# Patient Record
Sex: MALE | Race: WHITE | Employment: OTHER | URBAN - METROPOLITAN AREA
[De-identification: names, ages, dates, MRNs, and addresses within clinical notes are randomized per-mention and may not be internally consistent; named-entity substitution may affect disease eponyms.]

---

## 2017-01-05 ENCOUNTER — HOSPITAL ENCOUNTER (OUTPATIENT)
Dept: LAB | Age: 72
Discharge: HOME | End: 2017-01-05
Attending: INTERNAL MEDICINE
Payer: MEDICARE

## 2017-01-05 ENCOUNTER — HOSPITAL ENCOUNTER (OUTPATIENT)
Dept: CT IMAGING | Age: 72
Discharge: HOME | End: 2017-01-05
Attending: INTERNAL MEDICINE
Payer: MEDICARE

## 2017-01-05 DIAGNOSIS — R91.8 LUNG MASS: ICD-10-CM

## 2017-01-05 DIAGNOSIS — J98.4 CAVITATING MASS OF LUNG: ICD-10-CM

## 2017-01-05 PROCEDURE — 87102 FUNGUS ISOLATION CULTURE: CPT | Performed by: INTERNAL MEDICINE

## 2017-01-05 PROCEDURE — 71250 CT THORAX DX C-: CPT

## 2017-01-05 PROCEDURE — 87106 FUNGI IDENTIFICATION YEAST: CPT | Performed by: INTERNAL MEDICINE

## 2017-01-07 LAB
BACTERIA SPEC CULT: NORMAL
GRAM STN SPEC: NORMAL
SERVICE CMNT-IMP: NORMAL

## 2017-01-10 PROBLEM — J44.9 COPD, SEVERE (HCC): Chronic | Status: ACTIVE | Noted: 2017-01-10

## 2017-01-10 LAB
FUNGUS SPEC CULT: NORMAL
FUNGUS STAIN, 188244: NORMAL
SPECIMEN SOURCE: NORMAL

## 2017-01-13 LAB
BACTERIA SPEC CULT: NORMAL
FUNGUS CULTURE, RFCO2T: POSITIVE
FUNGUS ID 1, (DID), RFIM1T: NORMAL
FUNGUS SMEAR, RFCO1T: ABNORMAL
REFLEX TO ID, RFCO3T: ABNORMAL
SERVICE CMNT-IMP: NORMAL
SPECIMEN SOURCE: ABNORMAL
SPECIMEN SOURCE: NORMAL

## 2017-02-09 PROBLEM — R63.4 WEIGHT LOSS: Status: ACTIVE | Noted: 2017-02-09

## 2017-02-16 ENCOUNTER — HOSPITAL ENCOUNTER (OUTPATIENT)
Dept: CT IMAGING | Age: 72
Discharge: HOME | End: 2017-02-16
Attending: INTERNAL MEDICINE
Payer: MEDICARE

## 2017-02-16 DIAGNOSIS — R91.8 LUNG MASS: ICD-10-CM

## 2017-02-16 PROCEDURE — 71250 CT THORAX DX C-: CPT

## 2017-02-20 LAB
ACID FAST STN SPEC: NEGATIVE
MYCOBACTERIUM SPEC QL CULT: NEGATIVE
SPECIMEN PREPARATION: NORMAL
SPECIMEN SOURCE: NORMAL

## 2017-05-15 ENCOUNTER — HOSPITAL ENCOUNTER (OUTPATIENT)
Dept: CT IMAGING | Age: 72
Discharge: HOME | End: 2017-05-15
Attending: NURSE PRACTITIONER
Payer: MEDICARE

## 2017-05-15 DIAGNOSIS — R91.8 LUNG MASS: ICD-10-CM

## 2017-05-15 PROCEDURE — 71250 CT THORAX DX C-: CPT

## 2017-05-18 PROBLEM — F17.210 CIGARETTE NICOTINE DEPENDENCE WITHOUT COMPLICATION: Status: ACTIVE | Noted: 2017-05-18

## 2017-11-13 ENCOUNTER — HOSPITAL ENCOUNTER (OUTPATIENT)
Dept: CT IMAGING | Age: 72
Discharge: HOME | End: 2017-11-13
Attending: NURSE PRACTITIONER
Payer: MEDICARE

## 2017-11-13 DIAGNOSIS — R91.8 LUNG MASS: ICD-10-CM

## 2017-11-13 DIAGNOSIS — R91.8 PULMONARY INFILTRATE: ICD-10-CM

## 2017-11-13 PROCEDURE — 71250 CT THORAX DX C-: CPT

## 2018-02-15 PROBLEM — H91.90 HEARING PROBLEM: Status: ACTIVE | Noted: 2018-02-15

## 2018-02-15 PROBLEM — D12.6 ADENOMATOUS POLYP OF COLON: Status: ACTIVE | Noted: 2018-02-15

## 2018-02-15 PROBLEM — F32.A MILD DEPRESSION: Status: ACTIVE | Noted: 2018-02-15

## 2018-05-21 PROBLEM — R91.1 LUNG NODULE: Status: ACTIVE | Noted: 2018-05-21

## 2018-09-21 PROBLEM — J85.2 LUNG ABSCESS (HCC): Status: ACTIVE | Noted: 2018-09-21

## 2018-11-01 PROBLEM — N18.9 CHRONIC KIDNEY DISEASE: Status: ACTIVE | Noted: 2018-11-01

## 2019-02-18 PROBLEM — Z79.899 ENCOUNTER FOR LONG-TERM CURRENT USE OF MEDICATION: Status: ACTIVE | Noted: 2019-02-18

## 2019-08-27 PROBLEM — J85.2 LUNG ABSCESS (HCC): Status: RESOLVED | Noted: 2018-09-21 | Resolved: 2019-08-27

## 2020-07-03 NOTE — PROGRESS NOTES
Confirmed scheduled procedure with patient's wife. Informed family member of time of arrival, entry location, and 1 visitor policy. Opportunity for questions provided. No concerns voiced at this time. Patient's wife confirmed patient did get COVID swab, results pending. Patient's wife stated she would try to obtain results from patient's PCP.

## 2020-07-05 NOTE — PROGRESS NOTES
Spoke with patient's wife. She will be calling PCP office in AM and will have COVID-19 result faxed to [facility]. Patient's wife verbalized understanding that we must have copy of NEGATIVE COVID-19 result for procedure to be performed. Confirm arrival time of 0930 for procedure. Pt's wife verbalized understanding.

## 2020-07-06 ENCOUNTER — ANESTHESIA (OUTPATIENT)
Dept: ENDOSCOPY | Age: 75
End: 2020-07-06
Payer: MEDICARE

## 2020-07-06 ENCOUNTER — HOSPITAL ENCOUNTER (OUTPATIENT)
Age: 75
Setting detail: OUTPATIENT SURGERY
Discharge: HOME | End: 2020-07-06
Attending: INTERNAL MEDICINE | Admitting: INTERNAL MEDICINE
Payer: MEDICARE

## 2020-07-06 ENCOUNTER — ANESTHESIA EVENT (OUTPATIENT)
Dept: ENDOSCOPY | Age: 75
End: 2020-07-06
Payer: MEDICARE

## 2020-07-06 VITALS
OXYGEN SATURATION: 97 % | HEART RATE: 73 BPM | BODY MASS INDEX: 21.98 KG/M2 | TEMPERATURE: 97 F | HEIGHT: 68 IN | WEIGHT: 145 LBS | DIASTOLIC BLOOD PRESSURE: 65 MMHG | SYSTOLIC BLOOD PRESSURE: 126 MMHG | RESPIRATION RATE: 18 BRPM

## 2020-07-06 PROCEDURE — 74011250636 HC RX REV CODE- 250/636: Performed by: NURSE ANESTHETIST, CERTIFIED REGISTERED

## 2020-07-06 PROCEDURE — 77030013991 HC SNR POLYP ENDOSC BSC -A: Performed by: INTERNAL MEDICINE

## 2020-07-06 PROCEDURE — 76040000026: Performed by: INTERNAL MEDICINE

## 2020-07-06 PROCEDURE — 77030021593 HC FCPS BIOP ENDOSC BSC -A: Performed by: INTERNAL MEDICINE

## 2020-07-06 PROCEDURE — 76060000032 HC ANESTHESIA 0.5 TO 1 HR: Performed by: INTERNAL MEDICINE

## 2020-07-06 PROCEDURE — 74011000250 HC RX REV CODE- 250: Performed by: NURSE ANESTHETIST, CERTIFIED REGISTERED

## 2020-07-06 PROCEDURE — 88305 TISSUE EXAM BY PATHOLOGIST: CPT

## 2020-07-06 PROCEDURE — 74011250636 HC RX REV CODE- 250/636: Performed by: ANESTHESIOLOGY

## 2020-07-06 RX ORDER — EPHEDRINE SULFATE/0.9% NACL/PF 50 MG/5 ML
SYRINGE (ML) INTRAVENOUS AS NEEDED
Status: DISCONTINUED | OUTPATIENT
Start: 2020-07-06 | End: 2020-07-06 | Stop reason: HOSPADM

## 2020-07-06 RX ORDER — LIDOCAINE HYDROCHLORIDE 20 MG/ML
INJECTION, SOLUTION EPIDURAL; INFILTRATION; INTRACAUDAL; PERINEURAL AS NEEDED
Status: DISCONTINUED | OUTPATIENT
Start: 2020-07-06 | End: 2020-07-06 | Stop reason: HOSPADM

## 2020-07-06 RX ORDER — PROPOFOL 10 MG/ML
INJECTION, EMULSION INTRAVENOUS
Status: DISCONTINUED | OUTPATIENT
Start: 2020-07-06 | End: 2020-07-06 | Stop reason: HOSPADM

## 2020-07-06 RX ORDER — GLYCOPYRROLATE 0.2 MG/ML
INJECTION INTRAMUSCULAR; INTRAVENOUS AS NEEDED
Status: DISCONTINUED | OUTPATIENT
Start: 2020-07-06 | End: 2020-07-06 | Stop reason: HOSPADM

## 2020-07-06 RX ORDER — SODIUM CHLORIDE, SODIUM LACTATE, POTASSIUM CHLORIDE, CALCIUM CHLORIDE 600; 310; 30; 20 MG/100ML; MG/100ML; MG/100ML; MG/100ML
1000 INJECTION, SOLUTION INTRAVENOUS CONTINUOUS
Status: DISCONTINUED | OUTPATIENT
Start: 2020-07-06 | End: 2020-07-06 | Stop reason: HOSPADM

## 2020-07-06 RX ORDER — PROPOFOL 10 MG/ML
INJECTION, EMULSION INTRAVENOUS AS NEEDED
Status: DISCONTINUED | OUTPATIENT
Start: 2020-07-06 | End: 2020-07-06 | Stop reason: HOSPADM

## 2020-07-06 RX ADMIN — Medication 15 MG: at 10:03

## 2020-07-06 RX ADMIN — PHENYLEPHRINE HYDROCHLORIDE 50 MCG: 10 INJECTION INTRAVENOUS at 10:33

## 2020-07-06 RX ADMIN — PROPOFOL 20 MG: 10 INJECTION, EMULSION INTRAVENOUS at 09:58

## 2020-07-06 RX ADMIN — PROPOFOL 50 MG: 10 INJECTION, EMULSION INTRAVENOUS at 09:55

## 2020-07-06 RX ADMIN — LIDOCAINE HYDROCHLORIDE 60 MG: 20 INJECTION, SOLUTION EPIDURAL; INFILTRATION; INTRACAUDAL; PERINEURAL at 09:55

## 2020-07-06 RX ADMIN — SODIUM CHLORIDE, SODIUM LACTATE, POTASSIUM CHLORIDE, AND CALCIUM CHLORIDE 1000 ML: 600; 310; 30; 20 INJECTION, SOLUTION INTRAVENOUS at 09:32

## 2020-07-06 RX ADMIN — PHENYLEPHRINE HYDROCHLORIDE 50 MCG: 10 INJECTION INTRAVENOUS at 10:07

## 2020-07-06 RX ADMIN — PROPOFOL 140 MCG/KG/MIN: 10 INJECTION, EMULSION INTRAVENOUS at 09:55

## 2020-07-06 RX ADMIN — GLYCOPYRROLATE 0.1 MG: 0.2 INJECTION INTRAMUSCULAR; INTRAVENOUS at 09:55

## 2020-07-06 RX ADMIN — PHENYLEPHRINE HYDROCHLORIDE 50 MCG: 10 INJECTION INTRAVENOUS at 10:03

## 2020-07-06 RX ADMIN — PHENYLEPHRINE HYDROCHLORIDE 50 MCG: 10 INJECTION INTRAVENOUS at 10:16

## 2020-07-06 NOTE — DISCHARGE INSTRUCTIONS
Gastrointestinal Colonoscopy/Flexible Sigmoidoscopy - Lower Exam Discharge Instructions  1. Call Dr. Usman Malagon at [phone] for any problems or questions. 2. Contact the doctors office for follow up appointment as directed  3. Medication may cause drowsiness for several hours, therefore:  · Do not drive or operate machinery for reminder of the day. · No alcohol today. · Do not make any important or legal decisions for 24 hours. · Do not sign any legal documents for 24 hours. 4. Ordinarily, you may resume regular diet and activity after exam unless otherwise specified by your physician. 5. Because of air put into your colon during exam, you may experience some abdominal distension, relieved by the passage of gas, for several hours. 6. Contact your physician if you have any of the following:  · Excessive amount of bleeding - large amount when having a bowel movement. Occasional specks of blood with bowel movement would not be unusual.  · Severe abdominal pain  · Fever or Chills  7. Polyp Removal - follow these additional instructions  · Soft diet for 24 hours, then resume regular diet   · No Aspirin, Advil, Aleve, Nuprin, Ibuprofen, or medications that contain these drugs for 2 weeks. Any additional instructions:   1. Follow -up pathology  2. Repeat colonoscopy depending on pathology. Instructions given to Ange Kitchen and other family members.

## 2020-07-06 NOTE — H&P
Gastroenterology Outpatient History and Physical    Patient: Skinny Garcia    Physician: Aylin Yoo MD    Vital Signs: There were no vitals taken for this visit. Allergies: Allergies   Allergen Reactions    Pollen Extracts Unknown (comments)       Chief Complaint: Hx f colon polyps  History of Present Illness: As Above    Justification for Procedure: As Above    History:  Past Medical History:   Diagnosis Date    Arthritis     Chronic obstructive pulmonary disease ([address]     daily and prn inhaler    Depression     anxiety, treated with med    Ear problems     Erectile disorder due to medical condition in male patient     GERD (gastroesophageal reflux disease)     omeprazole prn    Gout     Hearing reduced     has hearing aids, but wears them infrequently    History of colon polyps     ~2015    History of kidney stones     1-2 episodes years ago.  Hyperlipidemia     on no med    Hypertension     Peripheral vascular disease ([address]     Psoriasis     Transient ischemic attack     patient denies 6/26/20      Past Surgical History:   Procedure Laterality Date    HX ACL RECONSTRUCTION      HX BACK SURGERY      HX GI      colo    HX LITHOTRIPSY        Social History     Socioeconomic History    Marital status:      Spouse name: Not on file    Number of children: Not on file    Years of education: Not on file    Highest education level: Not on file   Tobacco Use    Smoking status: Current Some Day Smoker     Packs/day: 1.00     Years: 40.00     Pack years: 40.00     Types: Cigarettes     Last attempt to quit: 11/1/2016     Years since quitting: 3.6    Smokeless tobacco: Never Used    Tobacco comment: currently smoking 2-3 cigarettes a day   Substance and Sexual Activity    Alcohol use: No    Drug use: No   Social History Narrative     and lives with wife. No pets.       Family History   Problem Relation Age of Onset    Hypertension Mother     Kidney Disease Mother  Hypertension Father     Stroke Father        Medications:   Prior to Admission medications    Medication Sig Start Date End Date Taking? Authorizing Provider   budesonide-formoteroL (Symbicort) 160-4.5 mcg/actuation HFAA Take 2 Puffs by inhalation two (2) times a day. Provider, Samuel   chlorthalidone (HYGROTEN) 25 mg tablet Take 1 Tab by mouth daily. Indications: high blood pressure  Patient taking differently: Take 25 mg by mouth every morning. Indications: high blood pressure 2/24/20   Zully Yin MD   benazepriL (LOTENSIN) 40 mg tablet TAKE ONE TABLET BY MOUTH  DAILY  Patient taking differently: Take 40 mg by mouth every morning. 2/24/20   Zully Yin MD   metoprolol succinate (TOPROL-XL) 100 mg tablet TAKE ONE TABLET BY MOUTH ONCE DAILY. Indications: ventricular rate control in atrial fibrillation  Patient taking differently: Take 100 mg by mouth every morning. Indications: ventricular rate control in atrial fibrillation 2/24/20   Zully Yin MD   amLODIPine (NORVASC) 10 mg tablet TAKE ONE TABLET BY MOUTH ONCE DAILY. Patient taking differently: Take 10 mg by mouth every morning. 2/24/20   Zully Yin MD   allopurinoL (ZYLOPRIM) 300 mg tablet TAKE 1 TAB BY MOUTH DAILY. INDICATIONS: GOUT  Patient taking differently: Take 300 mg by mouth every morning. INDICATIONS: GOUT 2/24/20   Zully Yin MD   Omeprazole delayed release (PRILOSEC D/R) 20 mg tablet Take 20 mg by mouth daily as needed. 2/24/20   Zully Yin MD   albuterol (VENTOLIN HFA) 90 mcg/actuation inhaler Take 2 Puffs by inhalation every four (4) hours as needed for Shortness of Breath. 8/20/19   Zully Yin MD   escitalopram oxalate (LEXAPRO) 20 mg tablet TAKE ONE TABLET BY MOUTH ONCE DAILY. Patient taking differently: Take 20 mg by mouth every morning.  8/19/19   Zully Yin MD   clobetasol (TEMOVATE) 0.05 % external solution APPLY TWICE DAILY AS DIRECTED 4/25/19   Zully Yin MD   naproxen sodium (ALEVE) 220 mg tablet Take 220 mg by mouth every six (6) hours as needed.     Provider, Historical       Physical Exam:         Heart: regular rate and rhythm, S1, S2 normal, no murmur, click, rub or gallop   Lungs: chest clear, no wheezing, rales, normal symmetric air entry, Heart exam - S1, S2 normal, no murmur, no gallop, rate regular   Abdominal: Bowel sounds are normal, liver is not enlarged, spleen is not enlarged           Findings/Diagnosis: Hx of colon polyps        Signed:  Terrie Haro MD 7/6/2020

## 2020-07-06 NOTE — PROGRESS NOTES
Attempted pre-procedural visit, as requested. Pt had already been taken to his procedure earlier than anticipated, based on schedule.     Deborah Meng MDiv, [address] [place] [address]

## 2020-07-06 NOTE — ANESTHESIA POSTPROCEDURE EVALUATION
Procedure(s):  COLONOSCOPY/BMI 23  ENDOSCOPIC POLYPECTOMY.     total IV anesthesia    Anesthesia Post Evaluation        Patient location during evaluation: PACU  Patient participation: complete - patient participated  Level of consciousness: awake and alert  Pain management: adequate  Airway patency: patent  Anesthetic complications: no  Cardiovascular status: acceptable  Respiratory status: acceptable  Hydration status: acceptable  Post anesthesia nausea and vomiting:  none  Final Post Anesthesia Temperature Assessment:  Normothermia (36.0-37.5 degrees C)      INITIAL Post-op Vital signs:   Vitals Value Taken Time   /65 7/6/2020 11:04 AM   Temp 36.1 °C (97 °F) 7/6/2020 10:33 AM   Pulse 73 7/6/2020 10:33 AM   Resp 18 7/6/2020 11:04 AM   SpO2 97 % 7/6/2020 11:04 AM

## 2020-07-06 NOTE — PROCEDURES
Colonoscopy Procedure Note    Indications: Hx of colon polyps    Anesthesia/Sedation: MAC IV     Pre-Procedure Physical:  No current facility-administered medications for this encounter. Pollen extracts    No data found. Exam:    Airway: clear   Heart: normal S1and S2    Lungs: clear bilateral  Abdomen: soft, nontender, bowel sounds present and normal in all quads   Mental Status: awake, alert and oriented to person, place and time        Procedure Details      Informed consent was obtained for the procedure, including sedation. Risks of perforation, hemorrhage, adverse drug reaction and aspiration were discussed. The patient was placed in the left lateral decubitus position. Based on the pre-procedure assessment, including review of the patient's medical history, medications, allergies, and review of systems, he had been deemed to be an appropriate candidate for conscious sedation; he was therefore sedated with the medications listed below. The patient was monitored continuously with ECG tracing, pulse oximetry, blood pressure monitoring, and direct observations. A rectal examination was performed. The colonoscope was inserted into the rectum and advanced under direct vision to the cecum. The quality of the colonic preparation was good. A careful inspection was made as the colonoscope was withdrawn, including a retroflexed view of the rectum; findings and interventions are described below. Appropriate photodocumentation was obtained. Findings: Multiple polyps in the sigmoid colon (8). The largest was ne cm and was hot snared. These polyps were removed by snare or cold bxed away. Sigmoid diverticulosis was seen. Large internal hemorrhoids noted. Polyps were also seen in the TC and AC. Mostly these were 2 to 4 mm and removed by cold snare or cold bx technique.     Specimens: Polyps as above    Estimated Blood Loss: 1 to 2 ccs           Complications: None; patient tolerated the procedure well.           Attending Attestation: I performed the procedure. Impression:  Clon polyps (sigmoid, TC and AC), diverticulosis and IHs. Recommendations: F/U pathology, Avoid NSAIDs. Repeat colon depends on pathology and his age factor.     Massiel Luong MD

## 2020-07-06 NOTE — ROUTINE PROCESS
Pt discharged home with spouse , spouse driving, VSS, instructions reviewed with patient and spouse , understanding verbalized. All belongings sent home with Pt. Pt transported out via wheelchair by [employer].

## 2021-03-09 PROBLEM — I35.8 AORTIC HEART MURMUR: Status: ACTIVE | Noted: 2021-03-09

## 2021-09-27 PROBLEM — C43.4 MELANOMA OF NECK (HCC): Status: ACTIVE | Noted: 2021-09-27

## 2021-09-27 PROBLEM — C43.59 MELANOMA OF BACK (HCC): Status: ACTIVE | Noted: 2021-09-27

## 2021-10-12 ENCOUNTER — HOSPITAL ENCOUNTER (OUTPATIENT)
Dept: GENERAL RADIOLOGY | Age: 76
Discharge: HOME | End: 2021-10-12
Payer: MEDICARE

## 2021-10-12 ENCOUNTER — HOSPITAL ENCOUNTER (OUTPATIENT)
Dept: SURGERY | Age: 76
Discharge: HOME | End: 2021-10-12
Attending: SURGERY
Payer: MEDICARE

## 2021-10-12 VITALS
WEIGHT: 135.8 LBS | TEMPERATURE: 97.7 F | SYSTOLIC BLOOD PRESSURE: 130 MMHG | RESPIRATION RATE: 16 BRPM | HEIGHT: 68 IN | BODY MASS INDEX: 20.58 KG/M2 | HEART RATE: 66 BPM | OXYGEN SATURATION: 96 % | DIASTOLIC BLOOD PRESSURE: 63 MMHG

## 2021-10-12 LAB
ALBUMIN SERPL-MCNC: 3.1 G/DL (ref 3.2–4.6)
ALBUMIN/GLOB SERPL: 0.9 {RATIO} (ref 1.2–3.5)
ALP SERPL-CCNC: 98 U/L (ref 50–136)
ALT SERPL-CCNC: 13 U/L (ref 12–65)
ANION GAP SERPL CALC-SCNC: 6 MMOL/L (ref 7–16)
AST SERPL-CCNC: 12 U/L (ref 15–37)
BASOPHILS # BLD: 0.1 K/UL (ref 0–0.2)
BASOPHILS NFR BLD: 1 % (ref 0–2)
BILIRUB SERPL-MCNC: 0.8 MG/DL (ref 0.2–1.1)
BUN SERPL-MCNC: 24 MG/DL (ref 8–23)
CALCIUM SERPL-MCNC: 8.7 MG/DL (ref 8.3–10.4)
CHLORIDE SERPL-SCNC: 108 MMOL/L (ref 98–107)
CO2 SERPL-SCNC: 28 MMOL/L (ref 21–32)
CREAT SERPL-MCNC: 1.63 MG/DL (ref 0.8–1.5)
DIFFERENTIAL METHOD BLD: ABNORMAL
EOSINOPHIL # BLD: 0.2 K/UL (ref 0–0.8)
EOSINOPHIL NFR BLD: 3 % (ref 0.5–7.8)
ERYTHROCYTE [DISTWIDTH] IN BLOOD BY AUTOMATED COUNT: 14.7 % (ref 11.9–14.6)
GLOBULIN SER CALC-MCNC: 3.4 G/DL (ref 2.3–3.5)
GLUCOSE SERPL-MCNC: 113 MG/DL (ref 65–100)
HCT VFR BLD AUTO: 36.5 % (ref 41.1–50.3)
HGB BLD-MCNC: 12.2 G/DL (ref 13.6–17.2)
IMM GRANULOCYTES # BLD AUTO: 0 K/UL (ref 0–0.5)
IMM GRANULOCYTES NFR BLD AUTO: 1 % (ref 0–5)
LYMPHOCYTES # BLD: 1.4 K/UL (ref 0.5–4.6)
LYMPHOCYTES NFR BLD: 20 % (ref 13–44)
MCH RBC QN AUTO: 30.3 PG (ref 26.1–32.9)
MCHC RBC AUTO-ENTMCNC: 33.4 G/DL (ref 31.4–35)
MCV RBC AUTO: 90.6 FL (ref 79.6–97.8)
MONOCYTES # BLD: 0.3 K/UL (ref 0.1–1.3)
MONOCYTES NFR BLD: 5 % (ref 4–12)
NEUTS SEG # BLD: 4.9 K/UL (ref 1.7–8.2)
NEUTS SEG NFR BLD: 71 % (ref 43–78)
NRBC # BLD: 0 K/UL (ref 0–0.2)
PLATELET # BLD AUTO: 187 K/UL (ref 150–450)
PMV BLD AUTO: 9.8 FL (ref 9.4–12.3)
POTASSIUM SERPL-SCNC: 3.9 MMOL/L (ref 3.5–5.1)
PROT SERPL-MCNC: 6.5 G/DL (ref 6.3–8.2)
RBC # BLD AUTO: 4.03 M/UL (ref 4.23–5.6)
SODIUM SERPL-SCNC: 142 MMOL/L (ref 136–145)
WBC # BLD AUTO: 7 K/UL (ref 4.3–11.1)

## 2021-10-12 PROCEDURE — 80053 COMPREHEN METABOLIC PANEL: CPT

## 2021-10-12 PROCEDURE — 71046 X-RAY EXAM CHEST 2 VIEWS: CPT

## 2021-10-12 PROCEDURE — 85025 COMPLETE CBC W/AUTO DIFF WBC: CPT

## 2021-10-12 NOTE — PERIOP NOTES
Patient verified name and     Order for consent was found in EHR and matches case posting; patient verified. Type 1B surgery, walk-in assessment complete. Labs per surgeon: CBC, CMP  Labs per anesthesia protocol: no additional  EKG: not required    Patient COVID test date 10/15/21; Patient did show for the appointment. [facility] [place]. If appointment is needed patient provided telephone number of [phone]. Hospital approved surgical skin cleanser and instructions given per hospital policy. Patient provided with and instructed on educational handouts including Guide to Surgery, Pain Management, Hand Hygiene, Blood Transfusion Education, and [facility] Brochure. Patient answered medical/surgical history questions at their best of ability. All prior to admission medications documented in [facility]. Original medication prescription bottle was visualized during patient appointment. Patient instructed to hold all vitamins 7 days prior to surgery and NSAIDS 5 days prior to surgery, patient verbalized understanding. Patient teach back successful and patient demonstrates knowledge of instructions.

## 2021-10-12 NOTE — PERIOP NOTES
Recent Results (from the past 12 hour(s))   CBC WITH AUTOMATED DIFF    Collection Time: 10/12/21 11:27 AM   Result Value Ref Range    WBC 7.0 4.3 - 11.1 K/uL    RBC 4.03 (L) 4.23 - 5.6 M/uL    HGB 12.2 (L) 13.6 - 17.2 g/dL    HCT 36.5 (L) 41.1 - 50.3 %    MCV 90.6 79.6 - 97.8 FL    MCH 30.3 26.1 - 32.9 PG    MCHC 33.4 31.4 - 35.0 g/dL    RDW 14.7 (H) 11.9 - 14.6 %    PLATELET 211 427 - 610 K/uL    MPV 9.8 9.4 - 12.3 FL    ABSOLUTE NRBC 0.00 0.0 - 0.2 K/uL    DF AUTOMATED      NEUTROPHILS 71 43 - 78 %    LYMPHOCYTES 20 13 - 44 %    MONOCYTES 5 4.0 - 12.0 %    EOSINOPHILS 3 0.5 - 7.8 %    BASOPHILS 1 0.0 - 2.0 %    IMMATURE GRANULOCYTES 1 0.0 - 5.0 %    ABS. NEUTROPHILS 4.9 1.7 - 8.2 K/UL    ABS. LYMPHOCYTES 1.4 0.5 - 4.6 K/UL    ABS. MONOCYTES 0.3 0.1 - 1.3 K/UL    ABS. EOSINOPHILS 0.2 0.0 - 0.8 K/UL    ABS. BASOPHILS 0.1 0.0 - 0.2 K/UL    ABS. IMM. GRANS. 0.0 0.0 - 0.5 K/UL   METABOLIC PANEL, COMPREHENSIVE    Collection Time: 10/12/21 11:27 AM   Result Value Ref Range    Sodium 142 136 - 145 mmol/L    Potassium 3.9 3.5 - 5.1 mmol/L    Chloride 108 (H) 98 - 107 mmol/L    CO2 28 21 - 32 mmol/L    Anion gap 6 (L) 7 - 16 mmol/L    Glucose 113 (H) 65 - 100 mg/dL    BUN 24 (H) 8 - 23 MG/DL    Creatinine 1.63 (H) 0.8 - 1.5 MG/DL    GFR est AA 53 (L) >60 ml/min/1.73m2    GFR est non-AA 44 (L) >60 ml/min/1.73m2    Calcium 8.7 8.3 - 10.4 MG/DL    Bilirubin, total 0.8 0.2 - 1.1 MG/DL    ALT (SGPT) 13 12 - 65 U/L    AST (SGOT) 12 (L) 15 - 37 U/L    Alk. phosphatase 98 50 - 136 U/L    Protein, total 6.5 6.3 - 8.2 g/dL    Albumin 3.1 (L) 3.2 - 4.6 g/dL    Globulin 3.4 2.3 - 3.5 g/dL    A-G Ratio 0.9 (L) 1.2 - 3.5       CXR Results  (Last 48 hours)               10/12/21 1246  XR CHEST PA LAT Final result    Impression:  COPD. No acute cardiopulmonary disease. Narrative:  EXAM: XR CHEST PA LAT       INDICATION: melanoma right neck c43.59       COMPARISON: None.        FINDINGS: PA and lateral radiographs of the chest demonstrate COPD with clear   lungs. Scarring at the left lung apex. The cardiac and mediastinal contours and   pulmonary vascularity are normal. The bones and soft tissues are within normal   limits.

## 2021-10-12 NOTE — PERIOP NOTES
PLEASE CONTINUE TAKING ALL PRESCRIPTION MEDICATIONS UP TO THE DAY OF SURGERY UNLESS OTHERWISE DIRECTED BELOW. DISCONTINUE all vitamins and supplements 7 days prior to surgery. DISCONTINUE Non-Steriodal Anti-Inflammatory (NSAIDS) such as Advil and Aleve 5 days prior to surgery. Home Medications to take  the day of surgery   Amlodipine (Norvasc)  Use Symbicort inhaler    Escitalopram oxalate (LEXAPRO)  Metoprolol (Lopressor)        Home Medications   to Hold   Morning of surgery DO NOT TAKE Benazepril (Lotensin), chlorthalidone(Hygroten)        Comments    Covid test 10/15 @ [address] [place] [address]    On the day before surgery please take Acetaminophen 1000mg in the morning and then again before bed. You may substitute for Tylenol 650 mg. Please do not bring home medications with you on the day of surgery unless otherwise directed by your nurse. If you are instructed to bring home medications, please give them to your nurse as they will be administered by the nursing staff. If you have any questions, please call [facility] [phone] or [facility] [phone]. A copy of this note was provided to the patient for reference.

## 2021-10-18 ENCOUNTER — ANESTHESIA EVENT (OUTPATIENT)
Dept: SURGERY | Age: 76
End: 2021-10-18
Payer: MEDICARE

## 2021-10-19 ENCOUNTER — ANESTHESIA (OUTPATIENT)
Dept: SURGERY | Age: 76
End: 2021-10-19
Payer: MEDICARE

## 2021-10-19 ENCOUNTER — APPOINTMENT (OUTPATIENT)
Dept: NUCLEAR MEDICINE | Age: 76
End: 2021-10-19
Attending: SURGERY
Payer: MEDICARE

## 2021-10-19 ENCOUNTER — HOSPITAL ENCOUNTER (OUTPATIENT)
Age: 76
Setting detail: OUTPATIENT SURGERY
Discharge: HOME | End: 2021-10-19
Attending: SURGERY | Admitting: SURGERY
Payer: MEDICARE

## 2021-10-19 VITALS
HEART RATE: 70 BPM | HEIGHT: 68 IN | DIASTOLIC BLOOD PRESSURE: 70 MMHG | SYSTOLIC BLOOD PRESSURE: 125 MMHG | RESPIRATION RATE: 14 BRPM | BODY MASS INDEX: 20.46 KG/M2 | TEMPERATURE: 97 F | OXYGEN SATURATION: 94 % | WEIGHT: 135 LBS

## 2021-10-19 DIAGNOSIS — C43.59 MELANOMA OF BACK (HCC): Primary | ICD-10-CM

## 2021-10-19 DIAGNOSIS — C43.9 MALIGNANT MELANOMA, UNSPECIFIED SITE (HCC): ICD-10-CM

## 2021-10-19 PROCEDURE — A9541 TC99M SULFUR COLLOID: HCPCS

## 2021-10-19 PROCEDURE — 76210000026 HC REC RM PH II 1 TO 1.5 HR: Performed by: SURGERY

## 2021-10-19 PROCEDURE — 74011250637 HC RX REV CODE- 250/637: Performed by: ANESTHESIOLOGY

## 2021-10-19 PROCEDURE — 74011250636 HC RX REV CODE- 250/636: Performed by: REGISTERED NURSE

## 2021-10-19 PROCEDURE — 77030019908 HC STETH ESOPH SIMS -A: Performed by: ANESTHESIOLOGY

## 2021-10-19 PROCEDURE — 11606 EXC TR-EXT MAL+MARG >4 CM: CPT | Performed by: SURGERY

## 2021-10-19 PROCEDURE — 88305 TISSUE EXAM BY PATHOLOGIST: CPT

## 2021-10-19 PROCEDURE — 77030011256 HC DRSG MEPILEX <16IN NO BORD MOLN -A: Performed by: SURGERY

## 2021-10-19 PROCEDURE — 74011250636 HC RX REV CODE- 250/636: Performed by: SURGERY

## 2021-10-19 PROCEDURE — 76210000063 HC OR PH I REC FIRST 0.5 HR: Performed by: SURGERY

## 2021-10-19 PROCEDURE — 77030037088 HC TUBE ENDOTRACH ORAL NSL COVD-A: Performed by: REGISTERED NURSE

## 2021-10-19 PROCEDURE — 77030039425 HC BLD LARYNG TRULITE DISP TELE -A: Performed by: REGISTERED NURSE

## 2021-10-19 PROCEDURE — 74011250636 HC RX REV CODE- 250/636: Performed by: ANESTHESIOLOGY

## 2021-10-19 PROCEDURE — 74011000250 HC RX REV CODE- 250: Performed by: SURGERY

## 2021-10-19 PROCEDURE — 77030003029 HC SUT VCRL J&J -B: Performed by: SURGERY

## 2021-10-19 PROCEDURE — 76060000033 HC ANESTHESIA 1 TO 1.5 HR: Performed by: SURGERY

## 2021-10-19 PROCEDURE — 76010000161 HC OR TIME 1 TO 1.5 HR INTENSV-TIER 1: Performed by: SURGERY

## 2021-10-19 PROCEDURE — 74011000250 HC RX REV CODE- 250: Performed by: REGISTERED NURSE

## 2021-10-19 PROCEDURE — 2709999900 HC NON-CHARGEABLE SUPPLY: Performed by: SURGERY

## 2021-10-19 RX ORDER — SODIUM CHLORIDE 0.9 % (FLUSH) 0.9 %
5-40 SYRINGE (ML) INJECTION EVERY 8 HOURS
Status: DISCONTINUED | OUTPATIENT
Start: 2021-10-19 | End: 2021-10-19 | Stop reason: HOSPADM

## 2021-10-19 RX ORDER — GLYCOPYRROLATE 0.2 MG/ML
INJECTION INTRAMUSCULAR; INTRAVENOUS AS NEEDED
Status: DISCONTINUED | OUTPATIENT
Start: 2021-10-19 | End: 2021-10-19 | Stop reason: HOSPADM

## 2021-10-19 RX ORDER — ROCURONIUM BROMIDE 10 MG/ML
INJECTION, SOLUTION INTRAVENOUS AS NEEDED
Status: DISCONTINUED | OUTPATIENT
Start: 2021-10-19 | End: 2021-10-19 | Stop reason: HOSPADM

## 2021-10-19 RX ORDER — SODIUM CHLORIDE 0.9 % (FLUSH) 0.9 %
5-40 SYRINGE (ML) INJECTION AS NEEDED
Status: DISCONTINUED | OUTPATIENT
Start: 2021-10-19 | End: 2021-10-19 | Stop reason: HOSPADM

## 2021-10-19 RX ORDER — FENTANYL CITRATE 50 UG/ML
INJECTION, SOLUTION INTRAMUSCULAR; INTRAVENOUS AS NEEDED
Status: DISCONTINUED | OUTPATIENT
Start: 2021-10-19 | End: 2021-10-19 | Stop reason: HOSPADM

## 2021-10-19 RX ORDER — SODIUM CHLORIDE, SODIUM LACTATE, POTASSIUM CHLORIDE, CALCIUM CHLORIDE 600; 310; 30; 20 MG/100ML; MG/100ML; MG/100ML; MG/100ML
75 INJECTION, SOLUTION INTRAVENOUS CONTINUOUS
Status: DISCONTINUED | OUTPATIENT
Start: 2021-10-19 | End: 2021-10-19 | Stop reason: HOSPADM

## 2021-10-19 RX ORDER — FLUMAZENIL 0.1 MG/ML
0.2 INJECTION INTRAVENOUS AS NEEDED
Status: DISCONTINUED | OUTPATIENT
Start: 2021-10-19 | End: 2021-10-19 | Stop reason: HOSPADM

## 2021-10-19 RX ORDER — CEFAZOLIN SODIUM/WATER 2 G/20 ML
2 SYRINGE (ML) INTRAVENOUS ONCE
Status: COMPLETED | OUTPATIENT
Start: 2021-10-19 | End: 2021-10-19

## 2021-10-19 RX ORDER — BUPIVACAINE HYDROCHLORIDE 2.5 MG/ML
INJECTION, SOLUTION EPIDURAL; INFILTRATION; INTRACAUDAL AS NEEDED
Status: DISCONTINUED | OUTPATIENT
Start: 2021-10-19 | End: 2021-10-19 | Stop reason: HOSPADM

## 2021-10-19 RX ORDER — ACETAMINOPHEN 500 MG
1000 TABLET ORAL ONCE
Status: COMPLETED | OUTPATIENT
Start: 2021-10-19 | End: 2021-10-19

## 2021-10-19 RX ORDER — HYDROCODONE BITARTRATE AND ACETAMINOPHEN 5; 325 MG/1; MG/1
1-2 TABLET ORAL EVERY 8 HOURS
Qty: 28 TABLET | Refills: 0 | Status: SHIPPED | OUTPATIENT
Start: 2021-10-19 | End: 2021-10-26

## 2021-10-19 RX ORDER — OXYCODONE HYDROCHLORIDE 5 MG/1
10 TABLET ORAL
Status: DISCONTINUED | OUTPATIENT
Start: 2021-10-19 | End: 2021-10-19 | Stop reason: HOSPADM

## 2021-10-19 RX ORDER — NEOSTIGMINE METHYLSULFATE 1 MG/ML
INJECTION, SOLUTION INTRAVENOUS AS NEEDED
Status: DISCONTINUED | OUTPATIENT
Start: 2021-10-19 | End: 2021-10-19 | Stop reason: HOSPADM

## 2021-10-19 RX ORDER — PROPOFOL 10 MG/ML
INJECTION, EMULSION INTRAVENOUS AS NEEDED
Status: DISCONTINUED | OUTPATIENT
Start: 2021-10-19 | End: 2021-10-19 | Stop reason: HOSPADM

## 2021-10-19 RX ORDER — LIDOCAINE HYDROCHLORIDE 10 MG/ML
0.1 INJECTION INFILTRATION; PERINEURAL AS NEEDED
Status: DISCONTINUED | OUTPATIENT
Start: 2021-10-19 | End: 2021-10-19 | Stop reason: HOSPADM

## 2021-10-19 RX ORDER — DIPHENHYDRAMINE HYDROCHLORIDE 50 MG/ML
12.5 INJECTION, SOLUTION INTRAMUSCULAR; INTRAVENOUS
Status: DISCONTINUED | OUTPATIENT
Start: 2021-10-19 | End: 2021-10-19 | Stop reason: HOSPADM

## 2021-10-19 RX ORDER — ONDANSETRON 2 MG/ML
INJECTION INTRAMUSCULAR; INTRAVENOUS AS NEEDED
Status: DISCONTINUED | OUTPATIENT
Start: 2021-10-19 | End: 2021-10-19 | Stop reason: HOSPADM

## 2021-10-19 RX ORDER — LIDOCAINE HYDROCHLORIDE 20 MG/ML
INJECTION, SOLUTION EPIDURAL; INFILTRATION; INTRACAUDAL; PERINEURAL AS NEEDED
Status: DISCONTINUED | OUTPATIENT
Start: 2021-10-19 | End: 2021-10-19 | Stop reason: HOSPADM

## 2021-10-19 RX ORDER — OXYCODONE HYDROCHLORIDE 5 MG/1
5 TABLET ORAL
Status: COMPLETED | OUTPATIENT
Start: 2021-10-19 | End: 2021-10-19

## 2021-10-19 RX ORDER — EPHEDRINE SULFATE/0.9% NACL/PF 50 MG/5 ML
SYRINGE (ML) INTRAVENOUS AS NEEDED
Status: DISCONTINUED | OUTPATIENT
Start: 2021-10-19 | End: 2021-10-19 | Stop reason: HOSPADM

## 2021-10-19 RX ORDER — NALOXONE HYDROCHLORIDE 0.4 MG/ML
0.1 INJECTION, SOLUTION INTRAMUSCULAR; INTRAVENOUS; SUBCUTANEOUS
Status: DISCONTINUED | OUTPATIENT
Start: 2021-10-19 | End: 2021-10-19 | Stop reason: HOSPADM

## 2021-10-19 RX ORDER — HYDROMORPHONE HYDROCHLORIDE 2 MG/ML
0.5 INJECTION, SOLUTION INTRAMUSCULAR; INTRAVENOUS; SUBCUTANEOUS
Status: DISCONTINUED | OUTPATIENT
Start: 2021-10-19 | End: 2021-10-19 | Stop reason: HOSPADM

## 2021-10-19 RX ADMIN — GLYCOPYRROLATE 0.1 MG: 0.2 INJECTION, SOLUTION INTRAMUSCULAR; INTRAVENOUS at 15:32

## 2021-10-19 RX ADMIN — Medication 1 MG: at 15:30

## 2021-10-19 RX ADMIN — ACETAMINOPHEN 1000 MG: 500 TABLET ORAL at 09:53

## 2021-10-19 RX ADMIN — Medication 1 MG: at 15:32

## 2021-10-19 RX ADMIN — GLYCOPYRROLATE 0.2 MG: 0.2 INJECTION, SOLUTION INTRAMUSCULAR; INTRAVENOUS at 15:29

## 2021-10-19 RX ADMIN — GLYCOPYRROLATE 0.2 MG: 0.2 INJECTION, SOLUTION INTRAMUSCULAR; INTRAVENOUS at 15:31

## 2021-10-19 RX ADMIN — PROPOFOL 20 MG: 10 INJECTION, EMULSION INTRAVENOUS at 14:56

## 2021-10-19 RX ADMIN — SODIUM CHLORIDE, SODIUM LACTATE, POTASSIUM CHLORIDE, AND CALCIUM CHLORIDE 75 ML/HR: 600; 310; 30; 20 INJECTION, SOLUTION INTRAVENOUS at 09:53

## 2021-10-19 RX ADMIN — CEFAZOLIN 2 G: 1 INJECTION, POWDER, FOR SOLUTION INTRAVENOUS at 14:50

## 2021-10-19 RX ADMIN — Medication 5 MG: at 15:28

## 2021-10-19 RX ADMIN — Medication 1 MG: at 15:33

## 2021-10-19 RX ADMIN — ONDANSETRON 4 MG: 2 INJECTION INTRAMUSCULAR; INTRAVENOUS at 15:15

## 2021-10-19 RX ADMIN — ROCURONIUM BROMIDE 30 MG: 10 INJECTION, SOLUTION INTRAVENOUS at 14:32

## 2021-10-19 RX ADMIN — PHENYLEPHRINE HYDROCHLORIDE 50 MCG: 10 INJECTION INTRAVENOUS at 14:38

## 2021-10-19 RX ADMIN — Medication 5 MG: at 15:18

## 2021-10-19 RX ADMIN — GLYCOPYRROLATE 0.1 MG: 0.2 INJECTION, SOLUTION INTRAMUSCULAR; INTRAVENOUS at 15:33

## 2021-10-19 RX ADMIN — Medication 5 MG: at 14:52

## 2021-10-19 RX ADMIN — FENTANYL CITRATE 100 MCG: 50 INJECTION INTRAMUSCULAR; INTRAVENOUS at 14:31

## 2021-10-19 RX ADMIN — OXYCODONE 5 MG: 5 TABLET ORAL at 16:18

## 2021-10-19 RX ADMIN — Medication 5 MG: at 15:03

## 2021-10-19 RX ADMIN — PROPOFOL 150 MG: 10 INJECTION, EMULSION INTRAVENOUS at 14:31

## 2021-10-19 RX ADMIN — Medication 5 MG: at 14:50

## 2021-10-19 RX ADMIN — LIDOCAINE HYDROCHLORIDE 70 MG: 20 INJECTION, SOLUTION EPIDURAL; INFILTRATION; INTRACAUDAL; PERINEURAL at 14:31

## 2021-10-19 NOTE — H&P
H&P/Consult Note/Progress Note/Office Note:   Yvonne Burrows  MRN: [id number]  :1945  Age:76 y.o.    HPI: Yvonne Burrows is a 77 y.o. male who is here for [facility] and [facility] of a cT4bN0 right neck/upper back melanoma and sentinel node excision on 10/19/21. Prior to surgery he was referred by Dr. Sandra Bueno and had a shave biopsy on 2021 shown below. Breslow depth 9.3 mm, Shoaib's IV +ulceration; 18mitosis/sq mm; no satellitosis, regression, or LVI   Skin lesion has been present for many months    Nothing particular made it better or worse. He has a history of severe pneumonia by his report but records below indicate the pulmonology was quite worried about the possibility of malignancy. It appears he was lost to follow-up with our pulmonologist but he and his wife report he has been seeing a pulmonologist in [address] named Dr. Jeronimo Diallo. 16 PET/CT  Impression:   1. Interval growth of the large centrally cavitary left apical mass now with air-fluid level,   most likely representing an enlarging primary bronchogenic carcinoma. 2.  Increased small nodular opacities in the right upper lobe, some demonstrating mild FDG uptake,   these may be infectious or inflammatory, although metastatic disease cannot be excluded. 3.  Increased mild mediastinal lymphadenopathy, also demonstrating mild FDG uptake, may be hyperplastic or neoplastic. 4.  No evidence of metastatic disease in the abdomen or pelvis. 16 lung biopsy  \"DIO MASS BIOPSY\":  FRAGMENTS OF BRONCHIAL MUCOSA WITH ACUTE AND CHRONIC INFLAMMATION,   NONDIAGNOSTIC FOR MALIGNANT TUMOR AND GRANULOMA. FRAGMENTS OF ACUTELY INFLAMED FIBRINOUS DEBRIS SUSPICIOUS FOR ULCER BASE    17 Chest CT  IMPRESSION:  1. Decreasing size of the left upper lobe cavitary lesion at the apex. 2. Stable 5 mm subpleural pulmonary nodule in the lingula. Recommend continued imaging follow-up in 12 months.       10/12/21 CXR, 2 views (pre-op)  COPD with clear lungs. Scarring at the left lung apex. The cardiac  and mediastinal contours and pulmonary vascularity are normal.   The bones and soft tissues are within normal limits.      IMPRESSION  COPD. No acute cardiopulmonary disease. 10/12/21 LFTs (pre-op) normal                Past Medical History:   Diagnosis Date    Arthritis     Chronic obstructive pulmonary disease (HCC)     daily and prn inhaler    Depression     anxiety, treated with med    Ear problems     Erectile disorder due to medical condition in male patient     GERD (gastroesophageal reflux disease)     omeprazole prn    Gout     Hearing reduced     has hearing aids, but wears them infrequently    Heart murmur     Echo 4/2021- moderate aortic stenosis, EF 50-55%    History of colon polyps     ~2015    History of kidney stones     1-2 episodes years ago.     Hyperlipidemia     on no med    Hypertension     [facility] ([facility]     Peripheral vascular disease ([facility]     Psoriasis     Transient ischemic attack     patient denies 6/26/20     Past Surgical History:   Procedure Laterality Date    COLONOSCOPY N/A 7/6/2020    COLONOSCOPY/BMI 23 performed by Iván Sharpe MD at [facility] ENDOSCOPY    HX ACL RECONSTRUCTION      HX BACK SURGERY      HX LITHOTRIPSY       Current Facility-Administered Medications   Medication Dose Route Frequency    lidocaine (XYLOCAINE) 10 mg/mL (1 %) injection 0.1 mL  0.1 mL SubCUTAneous PRN    lactated Ringers infusion  75 mL/hr IntraVENous CONTINUOUS    ceFAZolin (ANCEF) 2 g/20 mL in sterile water IV syringe  2 g IntraVENous ONCE    sodium chloride (NS) flush 5-40 mL  5-40 mL IntraVENous Q8H    sodium chloride (NS) flush 5-40 mL  5-40 mL IntraVENous PRN     Pollen extracts  Social History     Socioeconomic History    Marital status:      Spouse name: Not on file    Number of children: Not on file    Years of education: Not on file    Highest education level: Not on file Tobacco Use    Smoking status: Current Some Day Smoker     Packs/day: 1.00     Years: 40.00     Pack years: 40.00     Types: Cigarettes    Smokeless tobacco: Never Used    Tobacco comment: currently smoking 2-3 cigarettes a day   Substance and Sexual Activity    Alcohol use: No    Drug use: No   Social History Narrative     and lives with wife. No pets. Social Determinants of Health     Financial Resource Strain:     Difficulty of Paying Living Expenses:    Food Insecurity:     Worried About Running Out of Food in the Last Year:     [address] in the Last Year:    Transportation Needs:     Lack of Transportation (Medical):  Lack of Transportation (Non-Medical):    Physical Activity:     Days of Exercise per Week:     Minutes of Exercise per Session:    Stress:     Feeling of Stress :    Social Connections:     Frequency of Communication with Friends and Family:     Frequency of Social Gatherings with Friends and Family:     Attends Yarsanism Services:     Active Member of Clubs or Organizations:     Attends Club or Organization Meetings:     Marital Status:      Social History     Tobacco Use   Smoking Status Current Some Day Smoker    Packs/day: 1.00    Years: 40.00    Pack years: 40.00    Types: Cigarettes   Smokeless Tobacco Never Used   Tobacco Comment    currently smoking 2-3 cigarettes a day     Family History   Problem Relation Age of Onset    Hypertension Mother     Kidney Disease Mother     Hypertension Father     Stroke Father      ROS: The patient has no difficulty with chest pain or shortness of breath. No fever or chills. Comprehensive review of systems was otherwise unremarkable except as noted above.     Physical Exam:   Visit Vitals  BP (!) 148/71 (BP 1 Location: Right upper arm, BP Patient Position: At rest)   Pulse (!) 59   Temp 97.9 °F (36.6 °C)   Resp 22   Ht 5' 8\" (1.727 m)   Wt 135 lb (61.2 kg)   SpO2 95%   BMI 20.53 kg/m²     Vitals: 10/19/21 0958 10/19/21 1148   BP: (!) 177/85 (!) 148/71   Pulse: 72 (!) 59   Resp: 22 22   Temp: 98.3 °F (36.8 °C) 97.9 °F (36.6 °C)   SpO2: 99% 95%   Weight: 135 lb (61.2 kg)    Height: 5' 8\" (1.727 m)      [unfilled]  [unfilled]    Constitutional: Alert, oriented, cooperative patient in no acute distress; appears stated age    Eyes:Sclera are clear. EOMs intact  ENMT: no external lesions gross hearing normal; no obvious neck masses, no ear or lip lesions, nares normal  CV: RRR. Normal perfusion  Resp: No JVD. Breathing is  non-labored; no audible wheezing. Healing shave biopsy site right postero-lateral neck/upper back  No satellite lesions  No regional adenopathy. GI: soft and non-distended     Musculoskeletal: unremarkable with normal function. No embolic signs or cyanosis. Neuro:  Oriented; moves all 4; no focal deficits  Psychiatric: normal affect and mood, no memory impairment    Recent vitals (if inpt):  @IPVITALS(24:)@    Amount and/or Complexity of Data Reviewed and Analyzed:  I reviewed and analyzed all of the unique labs and radiologic studies that are shown below as well as any that are in the HPI, and any that are in the expanded problem list below  *Each unique test, order, or document contributes to the combination of 2 or combination of 3 in Category 1 below. For this visit I also reviewed old records and prior notes. No results for input(s): WBC, HGB, PLT, NA, K, CL, CO2, BUN, CREA, GLU, PTP, INR, APTT, TBIL, TBILI, CBIL, ALT, AP, AML, AML, LPSE, LCAD, NH4, TROPT, TROIQ, PCO2, PO2, HCO3, HGBEXT, PLTEXT, INREXT, HGBEXT, PLTEXT, INREXT in the last 72 hours.     No lab exists for component: SGOT, GPT,  PH  Review of most recent CBC  Lab Results   Component Value Date/Time    WBC 7.0 10/12/2021 11:27 AM    HGB 12.2 (L) 10/12/2021 11:27 AM    HCT 36.5 (L) 10/12/2021 11:27 AM    PLATELET 707 07/79/3117 11:27 AM    MCV 90.6 10/12/2021 11:27 AM       Review of most recent [facility]  Lab Results   Component Value Date/Time    Sodium 142 10/12/2021 11:27 AM    Potassium 3.9 10/12/2021 11:27 AM    Chloride 108 (H) 10/12/2021 11:27 AM    CO2 28 10/12/2021 11:27 AM    Anion gap 6 (L) 10/12/2021 11:27 AM    Glucose 113 (H) 10/12/2021 11:27 AM    BUN 24 (H) 10/12/2021 11:27 AM    Creatinine 1.63 (H) 10/12/2021 11:27 AM    BUN/Creatinine ratio 11 09/09/2021 03:30 PM    GFR est AA 53 (L) 10/12/2021 11:27 AM    GFR est non-AA 44 (L) 10/12/2021 11:27 AM    Calcium 8.7 10/12/2021 11:27 AM       Review of most recent LFTs (and lipase if done)  Lab Results   Component Value Date/Time    ALT (SGPT) 13 10/12/2021 11:27 AM    AST (SGOT) 12 (L) 10/12/2021 11:27 AM    Alk. phosphatase 98 10/12/2021 11:27 AM    Bilirubin, total 0.8 10/12/2021 11:27 AM     Lab Results   Component Value Date/Time    Lipase 22 02/24/2020 10:08 AM       No results found for: INR, APTT, CBIL, LCAD, NH4, TROPT, TROIQ, INREXT, INREXT    Review of most recent HgbA1c  Lab Results   Component Value Date/Time    Hemoglobin A1c 5.6 08/19/2019 09:59 AM       Nutritional assessment screen for wound healing issues:  Lab Results   Component Value Date/Time    Protein, total 6.5 10/12/2021 11:27 AM    Albumin 3.1 (L) 10/12/2021 11:27 AM       @lastcovr@  XR Results (most recent):  Results from [place] encounter on 10/12/21    XR CHEST PA LAT    Narrative  EXAM: XR CHEST PA LAT    INDICATION: melanoma right neck c43.59    COMPARISON: None. FINDINGS: PA and lateral radiographs of the chest demonstrate COPD with clear  lungs. Scarring at the left lung apex. The cardiac and mediastinal contours and  pulmonary vascularity are normal. The bones and soft tissues are within normal  limits. Impression  COPD. No acute cardiopulmonary disease.       CT Results (most recent):  Results from Hospital Encounter encounter on 11/13/17    CT CHEST WO CONT    Narrative  CT of the chest without contrast.    CLINICAL INDICATION: Cavitary lesion, follow-up exam    PROCEDURE: Serial thin section axial images obtained from the thoracic inlet  through the upper abdomen without the administration of intravenous contrast.  Radiation dose reduction techniques were used for this study. Our CT scanners  use one or all of the following: Automated exposure control, adjusted of the mA  and/or kV according to patient size, iterative reconstruction    COMPARISON: Chest CT dated 5/15/2017, chest CT dated 11/22/2016    FINDINGS: A right thyroid lobe nodules again noted in grossly unchanged. No  mediastinal or axillary adenopathy. There is interstitial density with a  cavitary lesion again appreciated in the left lung apex. The cavity has  decreased in size now measuring 3.1 x 1.3 cm (previous 4.1 x 1.9 cm. Emphysematous changes are noted at the right lung apex. The remainder the lung  parenchyma is clear with a stable small 5 mm pulmonary nodule along the anterior  margin of the lingula. No pleural effusion or pneumothorax. Limited evaluation of the upper abdomen is unremarkable. No aggressive osseous lesions identified. Impression  IMPRESSION:  1. Decreasing size of the left upper lobe cavitary lesion at the apex. 2. Stable 5 mm subpleural pulmonary nodule in the lingula. Recommend continued  imaging follow-up in 12 months. US Results (most recent):  No results found for this or any previous visit.         Admission date (for inpatients): 10/19/2021   * No surgery found *  Procedure(s):  MELANOMA WIDE LOCAL EXCISION RIGHT NECK  SENTINEL NODE BIOPSY WITH LYMPHATIC MAPPING @ 1030  POSS SPLIT THICKNESS SKIN GRAFT FROM RIGHT THIGH *RIGHT SIDE UP DECUBITIS BEANBAG FIRST SUPINE*        ASSESSMENT/PLAN:  Problem List  Date Reviewed: 9/27/2021        Codes Class Noted    cT4b Invasive Melanoma of right postero lateral neck/upper back ICD-10-CM: C43.59  ICD-9-CM: 172.5  9/27/2021        Aortic heart murmur ICD-10-CM: I35.8  ICD-9-CM: 424.1  3/9/2021        Encounter for long-term current use of medication ICD-10-CM: Z79.899  ICD-9-CM: V58.69  2/18/2019        Chronic kidney disease ICD-10-CM: N18.9  ICD-9-CM: 585.9  11/1/2018        Lung nodule ICD-10-CM: R91.1  ICD-9-CM: 793.11  5/21/2018        Adenomatous polyp of colon ICD-10-CM: D12.6  ICD-9-CM: 211.3  2/15/2018        Hearing problem ICD-10-CM: H91.90  ICD-9-CM: V41.2  2/15/2018        Mild depression ([address] ICD-10-CM: F32.0  ICD-9-CM: 238  2/15/2018        Cigarette nicotine dependence without complication ANQ-01-AO: Q26.895  ICD-9-CM: 305.1  5/18/2017        Weight loss ICD-10-CM: R63.4  ICD-9-CM: 783.21  2/9/2017        COPD, severe (HCC) (Chronic) ICD-10-CM: J44.9  ICD-9-CM: 496  1/10/2017    Overview Signed 1/10/2017  5:10 PM by Malachi Montano NP     [facility] 12/28/16--The patient has an obstructive defect with a response to bronchodilators.  Lung volumes are consistent with air trapping.  The reduction in forced vital capacity is probably due to air trapping but because the total lung capacity was not recorded we cannot completely rule out a restrictive defect.  The diffusing capacity is decreased which is probably consistent with emphysema. Lung mass ICD-10-CM: R91.8  ICD-9-CM: 786.6  12/13/2016        ASCVD (arteriosclerotic cardiovascular disease) ICD-10-CM: I25.10  ICD-9-CM: 429.2, 440.9  11/17/2016    Overview Addendum 11/17/2016  3:06 PM by Tracy Cheatham MD     dyspnea pulmonary and cardiac ?                   Precordial pain ICD-10-CM: R07.2  ICD-9-CM: 786.51  11/17/2016        Skin cyst ICD-10-CM: L72.9  ICD-9-CM: 706.2  4/25/2016        Arthritis ICD-10-CM: M19.90  ICD-9-CM: 716.90  Unknown        Hypertension ICD-10-CM: I10  ICD-9-CM: 401.9  Unknown        Erectile disorder due to medical condition in male patient ICD-10-CM: N52.1  ICD-9-CM: 607.84  Unknown        Hx of gout ICD-10-CM: Z87.39  ICD-9-CM: V12.29  Unknown        Hyperlipidemia ICD-10-CM: E78.5  ICD-9-CM: 272.4  Unknown        Peripheral vascular disease ([facility] [address] ICD-10-CM: I73.9  ICD-9-CM: 443.9  Unknown        Psoriasis ICD-10-CM: L40.9  ICD-9-CM: 696.1  Unknown        Hx of transient ischemic attack (TIA) ICD-10-CM: R38.64  ICD-9-CM: V12.54  Unknown            Active Problems:    * No active hospital problems. *         Number and Complexity of Problems addressed and   Risks of complications and/or morbidity of management        cT4bN0 right neck/right upper back invasive melanoma  He is here for [facility] and [facility] of a right neck/upper back melanoma and sentinel node excision on 10/19/21. Informed consent was obtained for wide excision of the melanoma with possible split-thickness skin graft and sentinel node excision  Procedure risks were discussed including bleeding, infection, recurrence, prolonged healing, blood clots, risk of anesthesia, etc.. All his questions were answered. He is in favor proceeding. History of lung mass/pneumonia  I requested old records from his current pulmonologist in [address] who they identifies Dr. Lenin Reyes. I see nothing in care everywhere and it looks as if he was lost to follow-up with [facility] pulmonology in about 2017. Level of MDM (2/3 elements below)  Number and Complexity of Problems Addressed Amount and/or Complexity of Data to be Reviewed and Analyzed  *Each unique test, order, or document contributes to the combination of 2 or combination of 3 in Category 1 below.  Risk of Complications and/or Morbidity or Mortality of pt Management     82892  38788  Minimal  1self-limited or minor problem Minimal or none Minimal risk of morbidity from additional diagnostic testing or Rx   89106  79057 Low Low  2or more self-limited or minor problems;    or  1stable chronic illness;    or  6APGCK, uncomplicated illness or injury   Limited  (Must meet the requirements of at least 1 of the 2 categories)  Category 1: Tests and documents   Any combination of 2 from the following:  Review of prior external note(s) from each unique source*;  review of the result(s) of each unique test*;   ordering of each unique test*    or   Category 2: Assessment requiring an independent historian(s)  (For the categories of independent interpretation of tests and discussion of management or test interpretation, see moderate or high) Low risk of morbidity from additional diagnostic testing or treatment     02865  28101 Mod Moderate  1or more chronic illnesses with exacerbation, progression, or side effects of treatment;    or  2or more stable chronic illnesses;    or  1undiagnosed new problem with uncertain prognosis;    or  1acute illness with systemic symptoms;    or  1UNPGX complicated injury   Moderate  (Must meet the requirements of at least 1 out of 3 categories)  Category 1: Tests, documents, or independent historian(s)  Any combination of 3 from the following:   Review of prior external note(s) from each unique source*;  Review of the result(s) of each unique test*;  Ordering of each unique test*;  Assessment requiring an independent historian(s)    or  Category 2: Independent interpretation of tests   Independent interpretation of a test performed by another physician/other qualified health care professional (not separately reported);     or  Category 3: Discussion of management or test interpretation  Discussion of management or test interpretation with external physician/other qualified health care professional/appropriate source (not separately reported)   Moderate risk of morbidity from additional diagnostic testing or treatment  Examples only:  Prescription drug management   Decision regarding minor surgery with identified patient or procedure risk factors  Decision regarding elective major surgery without identified patient or procedure risk factors   Diagnosis or treatment significantly limited by social determinants of health       91728  75967 High High  1or more chronic illnesses with severe exacerbation, progression, or side effects of treatment;    or  1 acute or chronic illness or injury that poses a threat to life or bodily function   Extensive  (Must meet the requirements of at least 2 out of 3 categories)  Category 1: Tests, documents, or independent historian(s)  Any combination of 3 from the following:   Review of prior external note(s) from each unique source*;  Review of the result(s) of each unique test*;   Ordering of each unique test*;   Assessment requiring an independent historian(s)    or   Category 2: Independent interpretation of tests   Independent interpretation of a test performed by another physician/other qualified health care professional (not separately reported);     or  Category 3: Discussion of management or test interpretation  Discussion of management or test interpretation with external physician/other qualified health care professional/appropriate source (not separately reported)   High risk of morbidity from additional diagnostic testing or treatment  Examples only:  Drug therapy requiring intensive monitoring for toxicity  Decision regarding elective major surgery with identified patient or procedure risk factors  Decision regarding emergency major surgery  Decision regarding hospitalization  Decision not to resuscitate or to de-escalate care because of poor prognosis             I have personally performed a face-to-face diagnostic evaluation and management  service on this patient. I have independently seen the patient. I have independently obtained the above history from the patient/family. I have independently examined the patient with above findings. I have independently reviewed data/labs for this patient and developed the above plan of care (MDM). Signed: Stephanie Tatum.  Adeola Moreno MD, FACS

## 2021-10-19 NOTE — DISCHARGE INSTRUCTIONS
Dressings/Wound Care  Leave dressings alone until follow-up      Activity  No heavy lifting. No driving until you are off pain meds for 24hrs and have no pain with movements associated with driving. Pain prescription (Norco) electronically sent to your pharmacy  Follow-up with Dr Wander Wagoner nurse practitioner Beryl Denise NP or Mahsa Barkley NP) in 2 weeks for a dressing change. Then see Dr Pastora Cardozo 1st week of February, 2021 for recheck. [address], [address]  (Call for an appt time unless one is already made for you by discharge nurse which is preferred -->[phone]-->option 1)    Diet  Resume Regular diet       MEDICATION INTERACTION:  During your procedure you potentially received a medication or medications which may reduce the effectiveness of oral contraceptives. Please consider other forms of contraception for 1 month following your procedure if you are currently using oral contraceptives as your primary form of birth control. In addition to this, we recommend continuing your oral contraceptive as prescribed, unless otherwise instructed by your physician, during this time    After general anesthesia or intravenous sedation, for 24 hours or while taking prescription Narcotics:  · Limit your activities  · A responsible adult needs to be with you for the next 24 hours  · Do not drive and operate hazardous machinery  · Do not make important personal or business decisions  · Do not drink alcoholic beverages  · If you have not urinated within 8 hours after discharge, and you are experiencing discomfort from urinary retention, please go to the nearest ED. · If you have sleep apnea and have a CPAP machine, please use it for all naps and sleeping. · Please use caution when taking narcotics and any of your home medications that may cause drowsiness. *  Please give a list of your current medications to your Primary Care Provider.   *  Please update this list whenever your medications are discontinued, doses are      changed, or new medications (including over-the-counter products) are added. *  Please carry medication information at all times in case of emergency situations. These are general instructions for a healthy lifestyle:  No smoking/ No tobacco products/ Avoid exposure to second hand smoke  Surgeon General's Warning:  Quitting smoking now greatly reduces serious risk to your health. Obesity, smoking, and sedentary lifestyle greatly increases your risk for illness  A healthy diet, regular physical exercise & weight monitoring are important for maintaining a healthy lifestyle    You may be retaining fluid if you have a history of heart failure or if you experience any of the following symptoms:  Weight gain of 3 pounds or more overnight or 5 pounds in a week, increased swelling in our hands or feet or shortness of breath while lying flat in bed. Please call your doctor as soon as you notice any of these symptoms; do not wait until your next office visit.

## 2021-10-19 NOTE — ANESTHESIA PREPROCEDURE EVALUATION
Relevant Problems   No relevant active problems       Anesthetic History   No history of anesthetic complications            Review of Systems / Medical History  Patient summary reviewed and pertinent labs reviewed    Pulmonary    COPD (daily MDI): moderate      Smoker         Neuro/Psych         TIA (Pt denies) and psychiatric history     Cardiovascular    Hypertension: well controlled          Hyperlipidemia    Exercise tolerance: >4 METS  Comments: Echo 4/21 - normal EF, mild-mod AS (RUMA 1.35, mean grad 24, peak grad 36)   GI/Hepatic/Renal     GERD: well controlled    Renal disease (Cr 1.4-1.6): CRI       Endo/Other        Arthritis     Other Findings              Physical Exam    Airway  Mallampati: II  TM Distance: > 6 cm  Neck ROM: normal range of motion   Mouth opening: Normal     Cardiovascular    Rhythm: regular  Rate: normal    Murmur: Grade 3, Aortic area     Dental         Pulmonary  Breath sounds clear to auscultation               Abdominal         Other Findings            Anesthetic Plan    ASA: 3  Anesthesia type: general            Anesthetic plan and risks discussed with: Patient and Spouse

## 2021-10-19 NOTE — PERIOP NOTES
Discharge instructions explained to wife Namrata Lacey). Penelope Steinberg verbalized understanding and time was allowed for questions.

## 2021-10-19 NOTE — ANESTHESIA POSTPROCEDURE EVALUATION
Procedure(s):  MELANOMA WIDE LOCAL EXCISION RIGHT NECK  ATTEMPTED SENTINEL NODE BIOPSY WITH LYMPHATIC MAPPING @ 1030. general    Anesthesia Post Evaluation      Multimodal analgesia: multimodal analgesia used between 6 hours prior to anesthesia start to PACU discharge  Patient location during evaluation: PACU  Patient participation: complete - patient participated  Level of consciousness: awake  Pain management: adequate  Airway patency: patent  Anesthetic complications: no  Cardiovascular status: acceptable and hemodynamically stable  Respiratory status: acceptable  Hydration status: acceptable  Comments: Pt fell to floor while moving from bed to wheelchair in preparation for discharge. Wife was assisting patient and he slid to floor with low impact. Patient denies currently denies pain in the hip, he is able to bear weight and move the hip without discomfort. He and his wife declined to have an X-ray to evaluate the hip and prefers to be discharged home. She assures me that she has assistance at home to help the patient get around the house. Acceptable for discharge from PACU. Post anesthesia nausea and vomiting:  none  Final Post Anesthesia Temperature Assessment:  Normothermia (36.0-37.5 degrees C)      INITIAL Post-op Vital signs:   Vitals Value Taken Time   /72 10/19/21 1619   Temp 36.2 °C (97.2 °F) 10/19/21 1552   Pulse 95 10/19/21 1629   Resp 12 10/19/21 1609   SpO2 94 % 10/19/21 1629   Vitals shown include unvalidated device data.

## 2021-10-19 NOTE — PERIOP NOTES
Pt was getting dressed with the assistance of his wife. Privacy curtains were closed. Pt fell from the side of the bed with right buttock and hip impacting the floor. Upon hearing the pt's wife call for help, I went to the bedside to assess the pt. Pt reported no pain and pt stated that he was able to be assisted to a wheelchair. No obvious injuries were noted. Anesthesiologist was notified of the pt fall. Prior to the pt being assisted with dressing, pt's wife was offered dressing assistance,if needed. Wife declined dressing assistance, prior to fall, and agreed to ask for help dressing, if needed.

## 2021-10-20 NOTE — OP NOTES
[facility]  OPERATIVE REPORT    Name:  Erum Ruiz  MR#:  [id number]  :  1945  ACCOUNT #:  [de-identified]  DATE OF SERVICE:  10/19/2021    PREOPERATIVE DIAGNOSIS:  Clinical T4bN0 right posterior neck/upper back invasive melanoma. POSTOPERATIVE DIAGNOSIS:  Clinical T4bN0 right posterior neck/upper back invasive melanoma. PROCEDURE PERFORMED:  Wide radical resection of right posterior neck/upper back invasive melanoma (CPT 34047). SURGEON:  Angelica Deluca MD    ASSISTANT:  None. ANESTHESIA:  General.    COMPLICATIONS:  None. SPECIMENS REMOVED:  Sent to Pathology for review and marked for orientation. IMPLANTS:  None. ESTIMATED BLOOD LOSS:  Less than 30 mL. PROCEDURE:  The patient was diagnosed with a clinical T4bN0 right posterior neck/upper back melanoma by shaved biopsy and was referred to surgery. His Breslow depth was 9.3 mm. This was Shoaib's level IV. He had ulceration and 18 mitotic figures per square millimeter with no satellitosis, regression or lymphovascular invasion. He was brought in on the same day of surgery for wide excision and sentinel node excision. His lymphoscintigraphy prior to surgery failed to map a node outside of his chest cavity. He may possibly have a supraclavicular node on the right versus a deep thoracic node, so we decided to proceed with surgery and attempt to localize the sentinel node with a Neoprobe intraoperatively. He was taken to the operating room, placed in a prone position and we prepped and draped his back and right lateral thigh in case skin grafting was needed. We marked out a 2 cm border around the melanoma excision site on his right upper back and posterior neck region. This was an 8 x 4.5 cm haven-shaped incision. We dissected full thickness through the skin and performed a wide radical resection of all the subcutaneous adipose tissue at the posterior fascia of the trapezius.   The specimen was marked for orientation and sent to Pathology for review. Irrigation was used. Good hemostasis was confirmed. We were able to close the incision without a skin graft by pulling advancement flaps together into apposition with interrupted deep dermal 3-0 Vicryl sutures and 2-0 nylon retention sutures. Skin clips were placed between the nylon retention sutures. The patient was given Marcaine injection prior to closure for local analgesia. There was no evidence of gross or palpable disease remaining following the resection. A dressing was placed. The patient was then reposition in the supine position. We prepped and draped him and used fresh instruments. The Neoprobe was brought up into the field and we examined his neck, supraclavicular, infraclavicular and shoulder regions aggressively and there was no evidence of a sentinel node. His lymph node mapping identified a node in the upper chest and it was not appropriate to performed a thoracotomy for sentinel node removal.  There was no accessible node from the neck region. For this reason, no sentinel node was excised. The patient was taken to Recovery in good condition. He tolerated the procedure well. There were no immediate complications.       Irma Pollard MD [id number]  D:  10/19/2021 16:42  T:  10/20/2021 3:41  JOB #:  [id number]

## 2022-02-28 ENCOUNTER — HOSPITAL ENCOUNTER (OUTPATIENT)
Dept: PET IMAGING | Age: 77
Discharge: HOME | End: 2022-02-28
Payer: MEDICARE

## 2022-02-28 DIAGNOSIS — C43.59 MELANOMA OF BACK (HCC): ICD-10-CM

## 2022-02-28 DIAGNOSIS — R63.4 WEIGHT LOSS: ICD-10-CM

## 2022-02-28 LAB
GLUCOSE BLD STRIP.AUTO-MCNC: 124 MG/DL (ref 65–100)
SERVICE CMNT-IMP: ABNORMAL

## 2022-02-28 PROCEDURE — 74011000636 HC RX REV CODE- 636: Performed by: SURGERY

## 2022-02-28 PROCEDURE — A9552 F18 FDG: HCPCS

## 2022-02-28 PROCEDURE — 82962 GLUCOSE BLOOD TEST: CPT

## 2022-02-28 RX ORDER — FLUDEOXYGLUCOSE F18 300 MCI/ML
10 INJECTION INTRAVENOUS ONCE
Status: COMPLETED | OUTPATIENT
Start: 2022-02-28 | End: 2022-02-28

## 2022-02-28 RX ORDER — SODIUM CHLORIDE 0.9 % (FLUSH) 0.9 %
10 SYRINGE (ML) INJECTION
Status: COMPLETED | OUTPATIENT
Start: 2022-02-28 | End: 2022-02-28

## 2022-02-28 RX ADMIN — FLUDEOXYGLUCOSE F18 12.64 MILLICURIE: 300 INJECTION INTRAVENOUS at 09:55

## 2022-02-28 RX ADMIN — Medication 30 ML: at 09:55

## 2022-02-28 RX ADMIN — DIATRIZOATE MEGLUMINE AND DIATRIZOATE SODIUM 10 ML: 660; 100 LIQUID ORAL; RECTAL at 09:55

## 2022-03-18 PROBLEM — N18.9 CHRONIC KIDNEY DISEASE: Status: ACTIVE | Noted: 2018-11-01

## 2022-03-18 PROBLEM — R91.1 LUNG NODULE: Status: ACTIVE | Noted: 2018-05-21

## 2022-03-18 PROBLEM — D12.6 ADENOMATOUS POLYP OF COLON: Status: ACTIVE | Noted: 2018-02-15

## 2022-03-19 PROBLEM — F32.A MILD DEPRESSION: Status: ACTIVE | Noted: 2018-02-15

## 2022-03-19 PROBLEM — I35.8 AORTIC HEART MURMUR: Status: ACTIVE | Noted: 2021-03-09

## 2022-03-19 PROBLEM — C43.59 MELANOMA OF BACK (HCC): Status: ACTIVE | Noted: 2021-09-27

## 2022-03-19 PROBLEM — H91.90 HEARING PROBLEM: Status: ACTIVE | Noted: 2018-02-15

## 2022-03-19 PROBLEM — J44.9 COPD, SEVERE (HCC): Status: ACTIVE | Noted: 2017-01-10

## 2022-03-19 PROBLEM — R63.4 WEIGHT LOSS: Status: ACTIVE | Noted: 2017-02-09

## 2022-03-20 PROBLEM — F17.210 CIGARETTE NICOTINE DEPENDENCE WITHOUT COMPLICATION: Status: ACTIVE | Noted: 2017-05-18

## 2022-03-20 PROBLEM — Z79.899 ENCOUNTER FOR LONG-TERM CURRENT USE OF MEDICATION: Status: ACTIVE | Noted: 2019-02-18

## 2022-03-24 ENCOUNTER — HOSPITAL ENCOUNTER (OUTPATIENT)
Dept: CT IMAGING | Age: 77
Discharge: HOME | End: 2022-03-24
Attending: OTOLARYNGOLOGY

## 2022-03-24 DIAGNOSIS — R22.1 NECK MASS: ICD-10-CM

## 2022-03-24 RX ORDER — SODIUM CHLORIDE 0.9 % (FLUSH) 0.9 %
10 SYRINGE (ML) INJECTION
Status: COMPLETED | OUTPATIENT
Start: 2022-03-24 | End: 2022-03-24

## 2022-03-24 RX ADMIN — Medication 10 ML: at 12:05

## 2022-03-29 NOTE — PROGRESS NOTES
Called pt w/ results of CT neck. There is clearly a mass within R supraclavicular region, fairly lately towards shoulder joint. It measures 2.5 cm and has some irregular edges, concerning for poss metastatic melanoma. There are no other concerning surrounding LNs and I recommend proceeding w/ excisional bx of this R supra-clavicular mass. I reviewed the risks of surgery and he would like to proceed. Will have my schedule Alber call to help schedule soon.

## 2022-04-12 ENCOUNTER — HOSPITAL ENCOUNTER (OUTPATIENT)
Dept: LAB | Age: 77
Discharge: HOME | End: 2022-04-12
Payer: MEDICARE

## 2022-04-12 DIAGNOSIS — R68.89 OTHER GENERAL SYMPTOMS AND SIGNS: ICD-10-CM

## 2022-04-12 DIAGNOSIS — C43.61 MALIGNANT MELANOMA OF RIGHT UPPER EXTREMITY INCLUDING SHOULDER (HCC): ICD-10-CM

## 2022-04-12 LAB
ALBUMIN SERPL-MCNC: 3.1 G/DL (ref 3.2–4.6)
ALBUMIN/GLOB SERPL: 0.9 {RATIO} (ref 1.2–3.5)
ALP SERPL-CCNC: 105 U/L (ref 50–136)
ALT SERPL-CCNC: 14 U/L (ref 12–65)
ANION GAP SERPL CALC-SCNC: 7 MMOL/L (ref 7–16)
AST SERPL-CCNC: 12 U/L (ref 15–37)
BASOPHILS # BLD: 0.1 K/UL (ref 0–0.2)
BASOPHILS NFR BLD: 1 % (ref 0–2)
BILIRUB SERPL-MCNC: 1.3 MG/DL (ref 0.2–1.1)
BUN SERPL-MCNC: 23 MG/DL (ref 8–23)
CALCIUM SERPL-MCNC: 9 MG/DL (ref 8.3–10.4)
CHLORIDE SERPL-SCNC: 108 MMOL/L (ref 98–107)
CO2 SERPL-SCNC: 27 MMOL/L (ref 21–32)
CREAT SERPL-MCNC: 1.9 MG/DL (ref 0.8–1.5)
DIFFERENTIAL METHOD BLD: ABNORMAL
EOSINOPHIL # BLD: 0.2 K/UL (ref 0–0.8)
EOSINOPHIL NFR BLD: 3 % (ref 0.5–7.8)
ERYTHROCYTE [DISTWIDTH] IN BLOOD BY AUTOMATED COUNT: 13.8 % (ref 11.9–14.6)
GLOBULIN SER CALC-MCNC: 3.3 G/DL (ref 2.3–3.5)
GLUCOSE SERPL-MCNC: 132 MG/DL (ref 65–100)
HCT VFR BLD AUTO: 35.5 %
HGB BLD-MCNC: 12.3 G/DL (ref 13.6–17.2)
IMM GRANULOCYTES # BLD AUTO: 0 K/UL (ref 0–0.5)
IMM GRANULOCYTES NFR BLD AUTO: 1 % (ref 0–5)
LYMPHOCYTES # BLD: 1.2 K/UL (ref 0.5–4.6)
LYMPHOCYTES NFR BLD: 20 % (ref 13–44)
MCH RBC QN AUTO: 30.8 PG (ref 26.1–32.9)
MCHC RBC AUTO-ENTMCNC: 34.6 G/DL (ref 31.4–35)
MCV RBC AUTO: 89 FL (ref 79.6–97.8)
MONOCYTES # BLD: 0.3 K/UL (ref 0.1–1.3)
MONOCYTES NFR BLD: 5 % (ref 4–12)
NEUTS SEG # BLD: 4.4 K/UL (ref 1.7–8.2)
NEUTS SEG NFR BLD: 70 % (ref 43–78)
NRBC # BLD: 0 K/UL (ref 0–0.2)
PLATELET # BLD AUTO: 198 K/UL (ref 150–450)
PMV BLD AUTO: 9.9 FL (ref 9.4–12.3)
POTASSIUM SERPL-SCNC: 3.3 MMOL/L (ref 3.5–5.1)
PROT SERPL-MCNC: 6.4 G/DL (ref 6.3–8.2)
RBC # BLD AUTO: 3.99 M/UL (ref 4.23–5.6)
SODIUM SERPL-SCNC: 142 MMOL/L (ref 136–145)
TSH SERPL DL<=0.005 MIU/L-ACNC: 2.3 UIU/ML (ref 0.36–3.74)
WBC # BLD AUTO: 6.2 K/UL (ref 4.3–11.1)

## 2022-04-12 PROCEDURE — 36415 COLL VENOUS BLD VENIPUNCTURE: CPT

## 2022-04-12 PROCEDURE — 85025 COMPLETE CBC W/AUTO DIFF WBC: CPT

## 2022-04-12 PROCEDURE — 84443 ASSAY THYROID STIM HORMONE: CPT

## 2022-04-12 PROCEDURE — 80053 COMPREHEN METABOLIC PANEL: CPT

## 2022-04-26 ENCOUNTER — DOCUMENTATION ONLY (OUTPATIENT)
Dept: HEMATOLOGY | Age: 77
End: 2022-04-26

## 2022-04-26 NOTE — PROGRESS NOTES
I spoke with Richar Ly regarding his Medicare and [address] [place] [address] Drive insurance coverage, potential oral medication authorizations, enrollment in the [employer] ([facility]) and the [address] [place] [address] ([address] [facility]), and assistance organization resource sheet. The patient will review the cancer programs. Next, I spoke with Richar Ly regarding the Oncology Care Model Notification Letter. I answered questions regarding the costs associated with Medicare Benefits. I explained to Richar SchulzMalachi the estimated cost of treatment and services for six months under the [facility]. Richar Ly was advised to contact Medicare or their healthcare provider for questions or concerns related to service of care. The Oncology Care Model provides different options of contact for Richar Ly regarding concerns and complaints of treatments and services. The cost of 6 months of medical treatments and services can be estimated to be $13,743.00. Next, I spoke with Richar Ly regarding his Prescription Drug Benefits. Next, I spoke with Richar Ly about billing questions and treatment services. We discussed copayments, deductibles, and out of pocket maximums. Next, I spoke with Richar Ly regarding the Limited Power of [employer] document. After reading the form, Richar Malachi signed the Limited Power of  document. Next, we discussed costs associated with the Opdivo Medication. Next, I spoke with Richar Ly regarding potential oral medication authorizations. I told him that if he ever had any problems getting his oral medications filled to give the dedicated [facility]  a call. Most of the time, it is simply an authorization that needs to be done with the insurance company.   Lastly, I gave Richar Ly a form with various resource organizations that could assist with specific needs (example:  transportation, lodging, preparing meals, home cleaning)      Richar Ly expressed Patient denies chest pain or shortness of breath.  appears more sleepy today Review of systems otherwise (-)  	  acetaminophen   Tablet .. 650 milliGRAM(s) Oral every 6 hours PRN  aspirin enteric coated 81 milliGRAM(s) Oral daily  cefepime   IVPB 1000 milliGRAM(s) IV Intermittent every 8 hours  collagenase Ointment 1 Application(s) Topical daily  Dakins Solution - Full Strength 1 Application(s) Topical once  dextrose 40% Gel 15 Gram(s) Oral once  dextrose 5% + sodium chloride 0.45%. 1000 milliLiter(s) IV Continuous <Continuous>  dextrose 50% Injectable 12.5 Gram(s) IV Push once  dextrose 50% Injectable 25 Gram(s) IV Push once  dextrose 50% Injectable 25 Gram(s) IV Push once  enoxaparin Injectable 40 milliGRAM(s) SubCutaneous daily  glucagon  Injectable 1 milliGRAM(s) IntraMuscular once  insulin lispro (ADMELOG) corrective regimen sliding scale   SubCutaneous three times a day before meals  insulin lispro (ADMELOG) corrective regimen sliding scale   SubCutaneous at bedtime  metroNIDAZOLE  IVPB 500 milliGRAM(s) IV Intermittent every 8 hours  simvastatin 40 milliGRAM(s) Oral at bedtime  vancomycin  IVPB 750 milliGRAM(s) IV Intermittent every 12 hours                            7.4    14.79 )-----------( 356      ( 14 Jan 2021 07:45 )             23.2       Hemoglobin: 7.4 g/dL (01-14 @ 07:45)  Hemoglobin: 8.7 g/dL (01-13 @ 07:43)  Hemoglobin: 9.0 g/dL (01-12 @ 07:34)  Hemoglobin: 9.0 g/dL (01-11 @ 06:00)  Hemoglobin: 8.2 g/dL (01-10 @ 06:22)      01-14    150<H>  |  121<H>  |  22<H>  ----------------------------<  214<H>  3.5   |  19<L>  |  0.69    Ca    8.2<L>      14 Jan 2021 07:45  Phos  2.1     01-13  Mg     1.9     01-13    TPro  4.8<L>  /  Alb  1.6<L>  /  TBili  0.3  /  DBili  x   /  AST  7<L>  /  ALT  8<L>  /  AlkPhos  44  01-14    Creatinine Trend: 0.69<--, 0.68<--, 0.76<--, 0.93<--, 1.06<--, 1.44<--    COAGS:           T(C): 36.3 (01-14-21 @ 09:49), Max: 37 (01-14-21 @ 05:13)  HR: 85 (01-14-21 @ 09:49) (82 - 106)  BP: 145/60 (01-14-21 @ 09:49) (117/56 - 146/75)  RR: 17 (01-14-21 @ 09:49) (14 - 18)  SpO2: 99% (01-14-21 @ 09:49) (99% - 100%)  Wt(kg): --    I&O's Summary    13 Jan 2021 07:01  -  14 Jan 2021 07:00  --------------------------------------------------------  IN: 0 mL / OUT: 1200 mL / NET: -1200 mL      HEENT:  (-)icterus (-)pallor  CV: N S1 S2 1/6 ANGELIC (+)2 Pulses B/l  Resp:  Clear to ausculatation B/L, normal effort  GI: (+) BS Soft, NT, ND  Lymph:  (-)Edema, (-)obvious lymphadenopathy  Skin: Warm to touch, Normal turgor  Psych: Appropriate mood and affect      ASSESSMENT/PLAN: 	77y  Female  wheelchair bound with medical history significant of HTN, Diabetes, Osteoarthritis, Osteoporosis, Peripheral arterial disease, Diabetic neuropathy, HLD, Schizophrenia historically preserved LV and R function, comes in with worsening ulceration to b/l heels s/p debridement.    - s/p Partial calcanectomy of right foot   - preop lower extremity amputation   - optimized from a cardiac prospective for potential lower extremity amputation  - cont abx per primary team  - vascular f/u    Jm Cortes MD, [facility]  BEEPER [phone]       understanding of the information above and all questions were answered to his satisfaction.

## 2022-04-26 NOTE — OCM COST ESTIMATE
I spoke with Maryam Luz regarding his  Medicare and [employer] insurance coverage. [address] will  the 20% coinsurance left over from Medicare. I gave patient the Oncology Care Model participation letter. I let her know that the average patient responsibility for 6 months of treatment for type cancer is $13,743.00 after Medicare pays. Note that this is an estimate only and the final charges may vary. Next, we spoke potential oral medication authorizations. I told him  that if he  ever had any problems getting his  oral medications filled to give the  a call. Most of the time, it is simply an authorization that needs to be done with the insurance company.      Lastly, I provided him a form with various resource organizations that could assist with specific needs (example:  transportation, lodging, preparing meals, home cleaning)

## 2022-04-27 ENCOUNTER — HOSPITAL ENCOUNTER (OUTPATIENT)
Dept: MRI IMAGING | Age: 77
Discharge: HOME | End: 2022-04-27
Attending: INTERNAL MEDICINE
Payer: MEDICARE

## 2022-04-27 VITALS — WEIGHT: 130 LBS | BODY MASS INDEX: 20.89 KG/M2 | HEIGHT: 66 IN

## 2022-04-27 DIAGNOSIS — C43.61 MALIGNANT MELANOMA OF RIGHT UPPER EXTREMITY INCLUDING SHOULDER (HCC): ICD-10-CM

## 2022-04-27 PROCEDURE — A9576 INJ PROHANCE MULTIPACK: HCPCS | Performed by: INTERNAL MEDICINE

## 2022-04-27 PROCEDURE — 74011250636 HC RX REV CODE- 250/636: Performed by: INTERNAL MEDICINE

## 2022-04-27 PROCEDURE — 70553 MRI BRAIN STEM W/O & W/DYE: CPT

## 2022-04-27 RX ORDER — MELATONIN
1000 DAILY
COMMUNITY

## 2022-04-27 RX ORDER — SODIUM CHLORIDE 0.9 % (FLUSH) 0.9 %
10 SYRINGE (ML) INJECTION
Status: COMPLETED | OUTPATIENT
Start: 2022-04-27 | End: 2022-04-27

## 2022-04-27 RX ADMIN — GADOTERIDOL 12 ML: 279.3 INJECTION, SOLUTION INTRAVENOUS at 12:35

## 2022-04-27 RX ADMIN — Medication 10 ML: at 12:35

## 2022-04-27 NOTE — PERIOP NOTES
Patient verified name and . Order for consent found in EHR and matches case posting; patient verifies procedure. Excisional Biopsy of Right Supra-Clavicular Mass    Type 1B surgery, PAT phone assessment complete. Orders received. Labs per surgeon: none  Labs per anesthesia protocol: K+ 3.3 on 22 within anesthesia guidelines. Echo dated 21 with moderate aortic stenosis; will have anesthesia review. Patient answered medical/surgical history questions at their best of ability. All prior to admission medications documented in [facility]. Patient instructed to take the following medications the day of surgery according to anesthesia guidelines with a small sip of water: symbicort inhaler, norvasc, metoprolol, lexapro, ativan if needed, allopurinol and flomax. Pt instructed to bring albuterol inhaler on the day of surgery. On the day before surgery please take Acetaminophen 1000mg in the morning and then again before bed. You may substitute for Tylenol 650 mg. Hold all vitamins/supplements 7 days prior to surgery and NSAIDS 5 days prior to surgery. Patient instructed on the following:    > Arrive at A Entrance, time of arrival to be called the day before by 1700  > NPO after midnight including gum, mints, and ice chips  > Responsible adult must drive patient to the hospital, stay during surgery, and patient will need supervision 24 hours after anesthesia  > Use antibacterial soap in shower the night before surgery and on the morning of surgery  > All piercings must be removed prior to arrival.    > Leave all valuables (money and jewelry) at home but bring insurance card and ID on DOS.   > Do not wear make-up, nail polish, lotions, cologne, perfumes, powders, or oil on skin. Artificial nails are not permitted.

## 2022-04-29 ENCOUNTER — HOSPITAL ENCOUNTER (OUTPATIENT)
Dept: SURGERY | Age: 77
Discharge: HOME | End: 2022-04-29

## 2022-05-05 ENCOUNTER — ANESTHESIA EVENT (OUTPATIENT)
Dept: SURGERY | Age: 77
End: 2022-05-05
Payer: MEDICARE

## 2022-05-05 RX ORDER — SODIUM CHLORIDE 0.9 % (FLUSH) 0.9 %
5-40 SYRINGE (ML) INJECTION EVERY 8 HOURS
Status: CANCELLED | OUTPATIENT
Start: 2022-05-05

## 2022-05-05 RX ORDER — SODIUM CHLORIDE 0.9 % (FLUSH) 0.9 %
5-40 SYRINGE (ML) INJECTION AS NEEDED
Status: CANCELLED | OUTPATIENT
Start: 2022-05-05

## 2022-05-05 RX ORDER — FENTANYL CITRATE 50 UG/ML
100 INJECTION, SOLUTION INTRAMUSCULAR; INTRAVENOUS ONCE
Status: CANCELLED | OUTPATIENT
Start: 2022-05-05 | End: 2022-05-05

## 2022-05-06 ENCOUNTER — ANESTHESIA (OUTPATIENT)
Dept: SURGERY | Age: 77
End: 2022-05-06
Payer: MEDICARE

## 2022-05-06 ENCOUNTER — HOSPITAL ENCOUNTER (OUTPATIENT)
Age: 77
Setting detail: OUTPATIENT SURGERY
Discharge: HOME | End: 2022-05-06
Attending: OTOLARYNGOLOGY | Admitting: OTOLARYNGOLOGY
Payer: MEDICARE

## 2022-05-06 VITALS
DIASTOLIC BLOOD PRESSURE: 56 MMHG | HEIGHT: 68 IN | BODY MASS INDEX: 20 KG/M2 | HEART RATE: 69 BPM | OXYGEN SATURATION: 93 % | TEMPERATURE: 97.7 F | WEIGHT: 132 LBS | RESPIRATION RATE: 16 BRPM | SYSTOLIC BLOOD PRESSURE: 124 MMHG

## 2022-05-06 DIAGNOSIS — R22.1 NECK MASS: ICD-10-CM

## 2022-05-06 PROCEDURE — 77030037088 HC TUBE ENDOTRACH ORAL NSL COVD-A: Performed by: ANESTHESIOLOGY

## 2022-05-06 PROCEDURE — 74011250636 HC RX REV CODE- 250/636: Performed by: ANESTHESIOLOGY

## 2022-05-06 PROCEDURE — 77030002996 HC SUT SLK J&J -A: Performed by: OTOLARYNGOLOGY

## 2022-05-06 PROCEDURE — 74011000250 HC RX REV CODE- 250: Performed by: ANESTHESIOLOGY

## 2022-05-06 PROCEDURE — 38510 BIOPSY/REMOVAL LYMPH NODES: CPT | Performed by: OTOLARYNGOLOGY

## 2022-05-06 PROCEDURE — 74011250636 HC RX REV CODE- 250/636: Performed by: NURSE ANESTHETIST, CERTIFIED REGISTERED

## 2022-05-06 PROCEDURE — 88305 TISSUE EXAM BY PATHOLOGIST: CPT

## 2022-05-06 PROCEDURE — 77030031139 HC SUT VCRL2 J&J -A: Performed by: OTOLARYNGOLOGY

## 2022-05-06 PROCEDURE — 2709999900 HC NON-CHARGEABLE SUPPLY: Performed by: OTOLARYNGOLOGY

## 2022-05-06 PROCEDURE — 76210000020 HC REC RM PH II FIRST 0.5 HR: Performed by: OTOLARYNGOLOGY

## 2022-05-06 PROCEDURE — 74011000250 HC RX REV CODE- 250: Performed by: NURSE ANESTHETIST, CERTIFIED REGISTERED

## 2022-05-06 PROCEDURE — 74011000250 HC RX REV CODE- 250: Performed by: OTOLARYNGOLOGY

## 2022-05-06 PROCEDURE — 77030040356 HC CORD BPLR FRCP COVD -A: Performed by: OTOLARYNGOLOGY

## 2022-05-06 PROCEDURE — 76010000149 HC OR TIME 1 TO 1.5 HR: Performed by: OTOLARYNGOLOGY

## 2022-05-06 PROCEDURE — 77030002916 HC SUT ETHLN J&J -A: Performed by: OTOLARYNGOLOGY

## 2022-05-06 PROCEDURE — 77030008462 HC STPLR SKN PROX J&J -A: Performed by: OTOLARYNGOLOGY

## 2022-05-06 PROCEDURE — 76210000006 HC OR PH I REC 0.5 TO 1 HR: Performed by: OTOLARYNGOLOGY

## 2022-05-06 PROCEDURE — 77030039425 HC BLD LARYNG TRULITE DISP TELE -A: Performed by: ANESTHESIOLOGY

## 2022-05-06 PROCEDURE — 76060000033 HC ANESTHESIA 1 TO 1.5 HR: Performed by: OTOLARYNGOLOGY

## 2022-05-06 PROCEDURE — 77030040361 HC SLV COMPR DVT MDII -B: Performed by: OTOLARYNGOLOGY

## 2022-05-06 PROCEDURE — 77030040922 HC BLNKT HYPOTHRM STRY -A: Performed by: ANESTHESIOLOGY

## 2022-05-06 PROCEDURE — 77030011267 HC ELECTRD BLD COVD -A: Performed by: OTOLARYNGOLOGY

## 2022-05-06 PROCEDURE — 77030018836 HC SOL IRR NACL ICUM -A: Performed by: OTOLARYNGOLOGY

## 2022-05-06 RX ORDER — FLUMAZENIL 0.1 MG/ML
0.2 INJECTION INTRAVENOUS
Status: DISCONTINUED | OUTPATIENT
Start: 2022-05-06 | End: 2022-05-06 | Stop reason: HOSPADM

## 2022-05-06 RX ORDER — LIDOCAINE HYDROCHLORIDE AND EPINEPHRINE 10; 10 MG/ML; UG/ML
INJECTION, SOLUTION INFILTRATION; PERINEURAL AS NEEDED
Status: DISCONTINUED | OUTPATIENT
Start: 2022-05-06 | End: 2022-05-06 | Stop reason: HOSPADM

## 2022-05-06 RX ORDER — SODIUM CHLORIDE 0.9 % (FLUSH) 0.9 %
5-40 SYRINGE (ML) INJECTION AS NEEDED
Status: DISCONTINUED | OUTPATIENT
Start: 2022-05-06 | End: 2022-05-06 | Stop reason: HOSPADM

## 2022-05-06 RX ORDER — SODIUM CHLORIDE 0.9 % (FLUSH) 0.9 %
5-40 SYRINGE (ML) INJECTION EVERY 8 HOURS
Status: DISCONTINUED | OUTPATIENT
Start: 2022-05-06 | End: 2022-05-06 | Stop reason: HOSPADM

## 2022-05-06 RX ORDER — NALOXONE HYDROCHLORIDE 0.4 MG/ML
0.2 INJECTION, SOLUTION INTRAMUSCULAR; INTRAVENOUS; SUBCUTANEOUS AS NEEDED
Status: DISCONTINUED | OUTPATIENT
Start: 2022-05-06 | End: 2022-05-06 | Stop reason: HOSPADM

## 2022-05-06 RX ORDER — DIPHENHYDRAMINE HYDROCHLORIDE 50 MG/ML
12.5 INJECTION, SOLUTION INTRAMUSCULAR; INTRAVENOUS
Status: DISCONTINUED | OUTPATIENT
Start: 2022-05-06 | End: 2022-05-06 | Stop reason: HOSPADM

## 2022-05-06 RX ORDER — DEXAMETHASONE SODIUM PHOSPHATE 4 MG/ML
INJECTION, SOLUTION INTRA-ARTICULAR; INTRALESIONAL; INTRAMUSCULAR; INTRAVENOUS; SOFT TISSUE AS NEEDED
Status: DISCONTINUED | OUTPATIENT
Start: 2022-05-06 | End: 2022-05-06 | Stop reason: HOSPADM

## 2022-05-06 RX ORDER — SODIUM CHLORIDE, SODIUM LACTATE, POTASSIUM CHLORIDE, CALCIUM CHLORIDE 600; 310; 30; 20 MG/100ML; MG/100ML; MG/100ML; MG/100ML
100 INJECTION, SOLUTION INTRAVENOUS CONTINUOUS
Status: DISCONTINUED | OUTPATIENT
Start: 2022-05-06 | End: 2022-05-06 | Stop reason: HOSPADM

## 2022-05-06 RX ORDER — ACETAMINOPHEN 500 MG
1000 TABLET ORAL
COMMUNITY

## 2022-05-06 RX ORDER — ONDANSETRON 2 MG/ML
INJECTION INTRAMUSCULAR; INTRAVENOUS AS NEEDED
Status: DISCONTINUED | OUTPATIENT
Start: 2022-05-06 | End: 2022-05-06 | Stop reason: HOSPADM

## 2022-05-06 RX ORDER — FENTANYL CITRATE 50 UG/ML
INJECTION, SOLUTION INTRAMUSCULAR; INTRAVENOUS AS NEEDED
Status: DISCONTINUED | OUTPATIENT
Start: 2022-05-06 | End: 2022-05-06 | Stop reason: HOSPADM

## 2022-05-06 RX ORDER — LIDOCAINE HYDROCHLORIDE 20 MG/ML
INJECTION, SOLUTION EPIDURAL; INFILTRATION; INTRACAUDAL; PERINEURAL AS NEEDED
Status: DISCONTINUED | OUTPATIENT
Start: 2022-05-06 | End: 2022-05-06 | Stop reason: HOSPADM

## 2022-05-06 RX ORDER — EPHEDRINE SULFATE/0.9% NACL/PF 50 MG/5 ML
SYRINGE (ML) INTRAVENOUS AS NEEDED
Status: DISCONTINUED | OUTPATIENT
Start: 2022-05-06 | End: 2022-05-06 | Stop reason: HOSPADM

## 2022-05-06 RX ORDER — PROPOFOL 10 MG/ML
INJECTION, EMULSION INTRAVENOUS AS NEEDED
Status: DISCONTINUED | OUTPATIENT
Start: 2022-05-06 | End: 2022-05-06 | Stop reason: HOSPADM

## 2022-05-06 RX ORDER — HYDROMORPHONE HYDROCHLORIDE 2 MG/ML
0.5 INJECTION, SOLUTION INTRAMUSCULAR; INTRAVENOUS; SUBCUTANEOUS
Status: DISCONTINUED | OUTPATIENT
Start: 2022-05-06 | End: 2022-05-06 | Stop reason: HOSPADM

## 2022-05-06 RX ORDER — LIDOCAINE HYDROCHLORIDE 10 MG/ML
0.1 INJECTION INFILTRATION; PERINEURAL AS NEEDED
Status: DISCONTINUED | OUTPATIENT
Start: 2022-05-06 | End: 2022-05-06 | Stop reason: HOSPADM

## 2022-05-06 RX ORDER — VASOPRESSIN 20 U/ML
INJECTION PARENTERAL AS NEEDED
Status: DISCONTINUED | OUTPATIENT
Start: 2022-05-06 | End: 2022-05-06 | Stop reason: HOSPADM

## 2022-05-06 RX ORDER — ROCURONIUM BROMIDE 10 MG/ML
INJECTION, SOLUTION INTRAVENOUS AS NEEDED
Status: DISCONTINUED | OUTPATIENT
Start: 2022-05-06 | End: 2022-05-06 | Stop reason: HOSPADM

## 2022-05-06 RX ORDER — GLYCOPYRROLATE 0.2 MG/ML
INJECTION INTRAMUSCULAR; INTRAVENOUS AS NEEDED
Status: DISCONTINUED | OUTPATIENT
Start: 2022-05-06 | End: 2022-05-06 | Stop reason: HOSPADM

## 2022-05-06 RX ORDER — NEOSTIGMINE METHYLSULFATE 1 MG/ML
INJECTION, SOLUTION INTRAVENOUS AS NEEDED
Status: DISCONTINUED | OUTPATIENT
Start: 2022-05-06 | End: 2022-05-06 | Stop reason: HOSPADM

## 2022-05-06 RX ORDER — OXYCODONE HYDROCHLORIDE 5 MG/1
5 TABLET ORAL
Status: DISCONTINUED | OUTPATIENT
Start: 2022-05-06 | End: 2022-05-06 | Stop reason: HOSPADM

## 2022-05-06 RX ORDER — OXYCODONE HYDROCHLORIDE 5 MG/1
10 TABLET ORAL
Status: DISCONTINUED | OUTPATIENT
Start: 2022-05-06 | End: 2022-05-06 | Stop reason: HOSPADM

## 2022-05-06 RX ORDER — ACETAMINOPHEN 500 MG
1000 TABLET ORAL ONCE
Status: DISCONTINUED | OUTPATIENT
Start: 2022-05-06 | End: 2022-05-06 | Stop reason: HOSPADM

## 2022-05-06 RX ADMIN — Medication 10 MG: at 08:37

## 2022-05-06 RX ADMIN — DEXAMETHASONE SODIUM PHOSPHATE 10 MG: 4 INJECTION, SOLUTION INTRAMUSCULAR; INTRAVENOUS at 08:24

## 2022-05-06 RX ADMIN — Medication 15 MG: at 08:41

## 2022-05-06 RX ADMIN — Medication 10 MG: at 08:14

## 2022-05-06 RX ADMIN — VASOPRESSIN 1 UNITS: 20 INJECTION PARENTERAL at 08:45

## 2022-05-06 RX ADMIN — Medication 10 MG: at 08:33

## 2022-05-06 RX ADMIN — PROPOFOL 100 MG: 10 INJECTION, EMULSION INTRAVENOUS at 08:08

## 2022-05-06 RX ADMIN — Medication 10 MG: at 08:38

## 2022-05-06 RX ADMIN — SODIUM CHLORIDE, SODIUM LACTATE, POTASSIUM CHLORIDE, AND CALCIUM CHLORIDE 100 ML/HR: 600; 310; 30; 20 INJECTION, SOLUTION INTRAVENOUS at 07:39

## 2022-05-06 RX ADMIN — PHENYLEPHRINE HYDROCHLORIDE 50 MCG: 10 INJECTION INTRAVENOUS at 08:18

## 2022-05-06 RX ADMIN — FENTANYL CITRATE 25 MCG: 50 INJECTION INTRAMUSCULAR; INTRAVENOUS at 08:58

## 2022-05-06 RX ADMIN — LIDOCAINE HYDROCHLORIDE 60 MG: 20 INJECTION, SOLUTION EPIDURAL; INFILTRATION; INTRACAUDAL; PERINEURAL at 08:08

## 2022-05-06 RX ADMIN — Medication 3 MG: at 09:08

## 2022-05-06 RX ADMIN — LIDOCAINE HYDROCHLORIDE 0.1 ML: 10 INJECTION, SOLUTION INFILTRATION; PERINEURAL at 07:39

## 2022-05-06 RX ADMIN — ONDANSETRON 4 MG: 2 INJECTION INTRAMUSCULAR; INTRAVENOUS at 08:27

## 2022-05-06 RX ADMIN — FENTANYL CITRATE 25 MCG: 50 INJECTION INTRAMUSCULAR; INTRAVENOUS at 08:30

## 2022-05-06 RX ADMIN — Medication 10 MG: at 08:18

## 2022-05-06 RX ADMIN — PHENYLEPHRINE HYDROCHLORIDE 50 MCG: 10 INJECTION INTRAVENOUS at 08:37

## 2022-05-06 RX ADMIN — PHENYLEPHRINE HYDROCHLORIDE 50 MCG: 10 INJECTION INTRAVENOUS at 08:40

## 2022-05-06 RX ADMIN — FENTANYL CITRATE 50 MCG: 50 INJECTION INTRAMUSCULAR; INTRAVENOUS at 08:08

## 2022-05-06 RX ADMIN — GLYCOPYRROLATE 0.4 MG: 0.2 INJECTION, SOLUTION INTRAMUSCULAR; INTRAVENOUS at 09:08

## 2022-05-06 RX ADMIN — VASOPRESSIN 1 UNITS: 20 INJECTION PARENTERAL at 09:00

## 2022-05-06 RX ADMIN — ROCURONIUM BROMIDE 30 MG: 50 INJECTION, SOLUTION INTRAVENOUS at 08:08

## 2022-05-06 NOTE — DISCHARGE INSTRUCTIONS
-There are dissolvable sutures over the incision. Please keep them moist w/ Vaseline ointment 10x daily to help them dissolve. You may shower/bathe as long as long as the incision stays out of the water.  -No strenuous activity or heavy lifting for 2 weeks  -Please start w/ soft foods and advance to a regular diet    PAIN  · Take pain medication as directed by your doctor. · Call your doctor if pain is NOT relieved by medication. · DO NOT take aspirin of blood thinners unless directed by your doctor. MEDICATION INTERACTION:During your procedure you potentially received a medication or medications which may reduce the effectiveness of oral contraceptives. Please consider other forms of contraception for 1 month following your procedure if you are currently using oral contraceptives as your primary form of birth control. In addition to this, we recommend continuing your oral contraceptive as prescribed, unless otherwise instructed by your physician, during this time      [address] IF   · Excessive bleeding that does not stop after holding pressure over the area  · Temperature of 101 degrees F or above  · Excessive redness, swelling or bruising, and/ or green or yellow, smelly discharge from incision    After general anesthesia or intravenous sedation, for 24 hours or while taking prescription Narcotics:  · Limit your activities  · A responsible adult needs to be with you for the next 24 hours  · Do not drive and operate hazardous machinery  · Do not make important personal or business decisions  · Do not drink alcoholic beverages  · If you have not urinated within 8 hours after discharge, and you are experiencing discomfort from urinary retention, please go to the nearest ED. · If you have sleep apnea and have a CPAP machine, please use it for all naps and sleeping. · Please use caution when taking narcotics and any of your home medications that may cause drowsiness.   *  Please give a list of your current medications to your Primary Care Provider. *  Please update this list whenever your medications are discontinued, doses are      changed, or new medications (including over-the-counter products) are added. *  Please carry medication information at all times in case of emergency situations. These are general instructions for a healthy lifestyle:  No smoking/ No tobacco products/ Avoid exposure to second hand smoke  Surgeon General's Warning:  Quitting smoking now greatly reduces serious risk to your health. Obesity, smoking, and sedentary lifestyle greatly increases your risk for illness  A healthy diet, regular physical exercise & weight monitoring are important for maintaining a healthy lifestyle    You may be retaining fluid if you have a history of heart failure or if you experience any of the following symptoms:  Weight gain of 3 pounds or more overnight or 5 pounds in a week, increased swelling in our hands or feet or shortness of breath while lying flat in bed. Please call your doctor as soon as you notice any of these symptoms; do not wait until your next office visit.

## 2022-05-06 NOTE — H&P
70 yo male who has h/o invasive melanoma of R upper back/shoulder (DOI 9.3 mm) which was first dx by Dr. Eliot Spaulding. He then underwent WLE w/ Dr. Nikhil Sullivan in Oct '21. He did have lymphoscintigraphy which revealed uptake in LN in R neck/upper chest. This was not resected at time of surgery and he has been followed closely since then. He had recent PET/CT which revealed study showed an enlarged right supraclavicular lymph node measuring 1.7 cm in short axis with an SUV of 6.8. this was confirmed on CT scan as well. He has lost wt recently but denies any localized pain in this area and reports no other concerning H&N sxs. Past Medical History:   Diagnosis Date    Adverse effect of anesthesia     Had problems urinating after last general anesthesia     Arthritis     Chronic obstructive pulmonary disease (HCC)     daily and prn inhaler, Followed by Dr. Dottie Lemus Depression     anxiety, treated with med    Ear problems     Erectile disorder due to medical condition in male patient     GERD (gastroesophageal reflux disease)     managed with meds     Gout     Hearing reduced     has hearing aids, but wears them infrequently    Heart murmur     Echo 4/2021- moderate aortic stenosis, EF 50-55%    History of colon polyps     ~2015    History of kidney stones     1-2 episodes years ago.     [place] (hard of hearing)     Hyperlipidemia     on no med    Hypertension     Managed with meds     Melanoma ([address]     Moderate aortic stenosis by prior echocardiogram 04/2021    Peripheral vascular disease ([address]     Psoriasis     Transient ischemic attack     patient denies 6/26/20     Past Surgical History:   Procedure Laterality Date    COLONOSCOPY N/A 7/6/2020    COLONOSCOPY/BMI 23 performed by Marya Armendariz [facility] at [facility] ENDOSCOPY    HX ACL RECONSTRUCTION      HX BACK SURGERY      HX LITHOTRIPSY      HX MALIGNANT SKIN LESION EXCISION       Social History     Socioeconomic History    Marital status:      Spouse name: Not on file    Number of children: Not on file    Years of education: Not on file    Highest education level: Not on file   Occupational History    Not on file   Tobacco Use    Smoking status: Current Some Day Smoker     Packs/day: 0.25     Years: 40.00     Pack years: 10.00     Types: Cigarettes    Smokeless tobacco: Never Used    Tobacco comment: currently smoking 2-3 cigarettes a day   Vaping Use    Vaping Use: Never used   Substance and Sexual Activity    Alcohol use: No    Drug use: No    Sexual activity: Not on file   Other Topics Concern    Not on file   Social History Narrative     and lives with wife. No pets. Social Determinants of Health     Financial Resource Strain:     Difficulty of Paying Living Expenses: Not on file   Food Insecurity:     Worried About Running Out of Food in the Last Year: Not on file    Daniel of Food in the Last Year: Not on file   Transportation Needs:     Lack of Transportation (Medical): Not on file    Lack of Transportation (Non-Medical):  Not on file   Physical Activity:     Days of Exercise per Week: Not on file    Minutes of Exercise per Session: Not on file   Stress:     Feeling of Stress : Not on file   Social Connections:     Frequency of Communication with Friends and Family: Not on file    Frequency of Social Gatherings with Friends and Family: Not on file    Attends [employer] Services: Not on file    Active Member of [address] [place] [address] or Organizations: Not on file    Attends Club or Organization Meetings: Not on file    Marital Status: Not on file   Intimate Partner Violence:     Fear of Current or Ex-Partner: Not on file    Emotionally Abused: Not on file    Physically Abused: Not on file    Sexually Abused: Not on file   Housing Stability:     Unable to Pay for Housing in the Last Year: Not on file    Number of [place] in the Last Year: Not on file    Unstable Housing in the Last Year: Not on file     Family History Problem Relation Age of Onset    Hypertension Mother     Kidney Disease Mother     Hypertension Father     Stroke Father      Allergies   Allergen Reactions    Pollen Extracts Unknown (comments)     No current facility-administered medications on file prior to encounter. Current Outpatient Medications on File Prior to Encounter   Medication Sig Dispense Refill    acetaminophen (Tylenol Extra Strength) 500 mg tablet Take 1,000 mg by mouth every six (6) hours as needed for Pain.  chlorthalidone (HYGROTON) 25 mg tablet TAKE 1 TABLET BY MOUTH DAILY FOR HIGH BLOOD PRESSURE 90 Tablet 3    LORazepam (ATIVAN) 1 mg tablet Take 1 tab twice a day as needed for anxiety 40 Tablet 1    tamsulosin (FLOMAX) 0.4 mg capsule TAKE 1 CAPSULE BY MOUTH DAILY 90 Capsule 3    metoprolol succinate (TOPROL-XL) 100 mg tablet TAKE ONE TABLET BY MOUTH ONCE DAILY. Indications: ventricular rate control in atrial fibrillation 90 Tablet 3    escitalopram oxalate (LEXAPRO) 20 mg tablet Take 1 Tablet by mouth daily. 30 Tablet 11    amLODIPine (NORVASC) 10 mg tablet TAKE ONE TABLET BY MOUTH ONCE DAILY. 90 Tablet 3    albuterol (Ventolin HFA) 90 mcg/actuation inhaler Take 2 Puffs by inhalation every four (4) hours as needed for Shortness of Breath. 1 Each 5    pravastatin (PRAVACHOL) 40 mg tablet TAKE 1 TABLET BY MOUTH EVERY NIGHT 90 Tablet 3    budesonide-formoteroL (Symbicort) 160-4.5 mcg/actuation HFAA Take 2 Puffs by inhalation two (2) times a day. 1 Each 11    magnesium oxide (MAG-OX) 400 mg tablet Take 1 Tab by mouth daily. 1 Tab 0     EXAM:  Visit Vitals  /65 (BP 1 Location: Left upper arm, BP Patient Position: Sitting)   Pulse 67   Temp 97.5 °F (36.4 °C)   Resp 18   Ht 5' 8\" (1.727 m)   Wt 132 lb (59.9 kg)   SpO2 96%   BMI 20.07 kg/m²     General: NAD, well-appearing  Neuro: No gross neuro deficits. CN's II-XII intact. No facial weakness. Eyes: EOMI. Pupils reactive. No periorbital edema/ecchymosis.  No nystagmus. Skin: No facial erythema, rashes or concerning lesions. Well-healed R upper back melanoma incision line. Nose: No external deviations or saddling. Intranasally, septum is midline without perforations, nasal mucosa appears healthy with no erythema, mucopurulence, or polyps. Mouth: Moist mucus membranes, normal tongue/palate mobility, no concerning mucosal lesions. Oropharynx clear with no erythema/exudate, no tonsillar hypertrophy. Ears: Normal appearing auricles, no hematomas. EACs clear with no cerumen impaction, healthy canal skin, TM's intact with no perforations or retraction pockets. No middle ear effusions. Neck: Soft, supple, no palpable lateral neck masses. No palpable parotid or submandibular masses. No thyromegaly or palpable thyroid nodules. No surgical scars. Lymphatics: No palpable cervical LAD. Resp: No audible stridor or wheezing. CV: No JVD, no murmurs  Extremities: No clubbing or cyanosis. Imaging:  Findings:       Glands: Parotid and submandibular glands are normal. 12 mm nodule right thyroid  lobe with smaller nodule suggested.       Vascular: Atherosclerotic changes are seen throughout the carotid bifurcations.      Lymph Nodes: 2.5 cm soft tissue mass in right supraclavicular fossa correlating  with PET/CT findings.      Airway Structures/ Tonsils: Nasopharynx, oropharynx, and hypopharynx are normal.  Larynx is normal. No compression of the airway. Parapharyngeal fat is normal.  [place] and lingual tonsils are normal.     Other: There are degenerative changes throughout cervical spine. Imaged  intracranial structures are normal. Orbits are normal. Paranasal sinuses and  mastoid air cells are normal. There is apical scarring, left greater than right  with underlying emphysematous changes.     IMPRESSION  1. Right supraclavicular mass corresponding to PET/CT abnormality. A/P:  There is clearly a mass within R supraclavicular region, fairly lately towards shoulder joint. It measures 2.5 cm and has some irregular edges, concerning for poss metastatic melanoma. There are no other concerning surrounding LNs and I recommend proceeding w/ excisional bx of this R supra-clavicular mass. I reviewed the risks of surgery and he would like to proceed.      [address] 1

## 2022-05-06 NOTE — BRIEF OP NOTE
Brief Postoperative Note    Patient: Kala Paz  YOB: 1945  MRN: [id number]    Date of Procedure: 5/6/2022     Pre-Op Diagnosis: R Supraclavicular mass [R22.2]    Post-Op Diagnosis: R Supraclavicular mass [R22.2]    Procedure(s):  EXCISIONAL BIOPSY RIGHT SUPRACLAVICULAR MASS    Surgeon(s):  Baljinder Enamorado MD    Surgical Assistant: None    Anesthesia: General     IVF: 700 cc    Estimated Blood Loss (mL): 10 cc    Complications: None    Specimens:   ID Type Source Tests Collected by Time Destination   1 : Right Neck Mass Fresh   Baljinder Enamorado MD 5/6/2022 0902 Pathology        Implants: * No implants in log *    Drains: * No LDAs found *    Findings: 2.5 cm firm dark colored LN in R supraclavicular region    Electronically Signed by David Smith MD on 5/6/2022 at 9:06 AM

## 2022-05-06 NOTE — ANESTHESIA PREPROCEDURE EVALUATION
Relevant Problems   RESPIRATORY SYSTEM   (+) COPD, severe (HCC)      NEUROLOGY   (+) Mild depression      CARDIOVASCULAR   (+) Aortic heart murmur   (+) Hypertension      RENAL FAILURE   (+) Chronic kidney disease      ENDOCRINE   (+) Arthritis      PERSONAL HX & FAMILY HX OF CANCER   (+) cT4b Invasive Melanoma of right postero lateral neck/upper back       Anesthetic History   No history of anesthetic complications            Review of Systems / Medical History  Patient summary reviewed and pertinent labs reviewed    Pulmonary    COPD (daily MDI): moderate      Smoker         Neuro/Psych         TIA (Pt denies) and psychiatric history     Cardiovascular    Hypertension: well controlled          Hyperlipidemia    Exercise tolerance: <4 METS  Comments: Echo 4/21 - normal EF, mild-mod AS (RUMA 1.35, mean grad 24, peak grad 36)   GI/Hepatic/Renal     GERD: well controlled    Renal disease (Cr 1.4-1.6): CRI       Endo/Other        Arthritis     Other Findings              Physical Exam    Airway  Mallampati: II  TM Distance: > 6 cm  Neck ROM: normal range of motion   Mouth opening: Normal     Cardiovascular    Rhythm: regular  Rate: normal    Murmur: Grade 3, Aortic area     Dental    Dentition: Poor dentition     Pulmonary  Breath sounds clear to auscultation               Abdominal         Other Findings            Anesthetic Plan    ASA: 3  Anesthesia type: general          Induction: Intravenous  Anesthetic plan and risks discussed with: Patient and Spouse

## 2022-05-06 NOTE — OP NOTES
[address] [address]  OPERATIVE REPORT    Name:  Bienvenido Piña  MR#:  [id number]  :  1945  ACCOUNT #:  [de-identified]  DATE OF SERVICE:  2022    PREOPERATIVE DIAGNOSIS:  Right supraclavicular mass. POSTOPERATIVE DIAGNOSIS:  Right supraclavicular mass. PROCEDURE PERFORMED:  Excisional biopsy of right supraclavicular mass. SURGEON:  Lujean Lanes, MD    ANESTHESIA:  General endotracheal.    ANESTHESIOLOGIST:  Madeleine Rolon MD    COMPLICATIONS:  None. SPECIMENS REMOVED:  Right neck mass - permanent. ESTIMATED BLOOD LOSS:  10 mL. OPERATIVE FINDINGS:  There was a firm 2.5 cm dark-colored lymph node within the lateral aspect of the right supraclavicular region. Excisional biopsy was performed. IV FLUIDS:  700 mL of crystalloid. DRAINS:  None. DISPOSITION:  PACU, then home. CONDITION:  Stable. BRIEF HISTORY:  The patient is a 78-year-old male with a history of melanoma, which was excised off of his right upper back, back in fall of . He had a recent PET scan, which revealed an enlarged mass within the right supraclavicular region with increased SUV uptake. He was further worked up with a CT scan, which revealed again a 2.5 cm mass within the right supraclavicular region. No other lymph nodes were noted. This was highly concerning for a metastatic melanoma and the decision was made to take him to the operating room for excisional biopsy of this mass. DESCRIPTION OF PROCEDURE:  The patient was brought back to the operating room, placed on the table in the supine position. General endotracheal anesthesia was inducted without any complications. Once the patient was adequately sedated, a total of 8 mL of 1% lidocaine with 1:100,000 epinephrine was injected along the planned incision line. He was then sterilely prepped and draped in the usual fashion. I began by designing a 3 cm incision just overlying this mass along the right supraclavicular region.   I incised through the skin and dermis with a 15 blade and then dissected through some platysma muscle and subcutaneous fat with Bovie electrocautery. I dissected down onto the anterior edge of the trapezius muscle. I skeletonized the border of the trapezius muscle and just deep to this mass, extending deep to the clavicle was a firm dark-colored mass most consistent with a lymph node. I used careful blunt dissection and bipolar electrocautery and began dissecting this mass away from the underlying fibrofatty tissue. There was no evidence of any invasion of the mass into the overlying muscle or deep down into the axillary artery. The mass was able to be excised en bloc and passed off for permanent pathology, labeled right neck mass. I irrigated out the wound bed and ensured adequate hemostasis using bipolar electrocautery. I had Anesthesia perform a Valsalva maneuver to ensure no further bleeding. Once hemostasis was ensured, I closed the incision in layers using 3-0 undyed Vicryl deep suture, reapproximated the platysma and dermis followed by 5-0 fast-absorbing gut in a running and locking fashion for the cutaneous layer. Bacitracin was placed over the incision. No dressing was required. This concluded the surgical portion of the procedure. The patient was then awakened from anesthesia, extubated and taken to the PACU in stable condition afterwards.       Mary Lou Johnson MD      [id number]  D:  05/06/2022 9:25  T:  05/06/2022 12:22  JOB #:  [id number]

## 2022-05-07 NOTE — ANESTHESIA POSTPROCEDURE EVALUATION
Procedure(s):  EXCISIONAL BIOPSY RIGHT SUPRACLAVICULAR MASS. general    Anesthesia Post Evaluation      Multimodal analgesia: multimodal analgesia used between 6 hours prior to anesthesia start to PACU discharge  Patient location during evaluation: PACU  Patient participation: complete - patient participated  Level of consciousness: awake and alert  Pain management: adequate  Airway patency: patent  Anesthetic complications: no  Cardiovascular status: acceptable and hemodynamically stable  Respiratory status: acceptable  Hydration status: acceptable  Post anesthesia nausea and vomiting:  controlled  Final Post Anesthesia Temperature Assessment:  Normothermia (36.0-37.5 degrees C)      INITIAL Post-op Vital signs:   Vitals Value Taken Time   /58 05/06/22 0950   Temp 36.5 °C (97.7 °F) 05/06/22 0918   Pulse 70 05/06/22 0949   Resp 16 05/06/22 0945   SpO2 93 % 05/06/22 0949   Vitals shown include unvalidated device data.

## 2022-05-10 ENCOUNTER — HOSPITAL ENCOUNTER (OUTPATIENT)
Dept: INFUSION THERAPY | Age: 77
Discharge: HOME | End: 2022-05-10
Payer: MEDICARE

## 2022-05-10 ENCOUNTER — HOSPITAL ENCOUNTER (OUTPATIENT)
Dept: LAB | Age: 77
Discharge: HOME | End: 2022-05-10
Payer: MEDICARE

## 2022-05-10 DIAGNOSIS — R68.89 OTHER GENERAL SYMPTOMS AND SIGNS: ICD-10-CM

## 2022-05-10 DIAGNOSIS — C43.59 MELANOMA OF BACK (HCC): Primary | ICD-10-CM

## 2022-05-10 DIAGNOSIS — C43.61 MALIGNANT MELANOMA OF RIGHT UPPER EXTREMITY INCLUDING SHOULDER (HCC): ICD-10-CM

## 2022-05-10 LAB
ALBUMIN SERPL-MCNC: 2.8 G/DL (ref 3.2–4.6)
ALBUMIN/GLOB SERPL: 0.8 {RATIO} (ref 1.2–3.5)
ALP SERPL-CCNC: 96 U/L (ref 50–136)
ALT SERPL-CCNC: 18 U/L (ref 12–65)
ANION GAP SERPL CALC-SCNC: 5 MMOL/L (ref 7–16)
AST SERPL-CCNC: 12 U/L (ref 15–37)
BASOPHILS # BLD: 0.1 K/UL (ref 0–0.2)
BASOPHILS NFR BLD: 1 % (ref 0–2)
BILIRUB SERPL-MCNC: 0.9 MG/DL (ref 0.2–1.1)
BUN SERPL-MCNC: 25 MG/DL (ref 8–23)
CALCIUM SERPL-MCNC: 8.3 MG/DL (ref 8.3–10.4)
CHLORIDE SERPL-SCNC: 106 MMOL/L (ref 98–107)
CO2 SERPL-SCNC: 30 MMOL/L (ref 21–32)
CREAT SERPL-MCNC: 1.8 MG/DL (ref 0.8–1.5)
DIFFERENTIAL METHOD BLD: ABNORMAL
EOSINOPHIL # BLD: 0.2 K/UL (ref 0–0.8)
EOSINOPHIL NFR BLD: 4 % (ref 0.5–7.8)
ERYTHROCYTE [DISTWIDTH] IN BLOOD BY AUTOMATED COUNT: 14.1 % (ref 11.9–14.6)
GLOBULIN SER CALC-MCNC: 3.3 G/DL (ref 2.3–3.5)
GLUCOSE SERPL-MCNC: 150 MG/DL (ref 65–100)
HBV SURFACE AB SERPL IA-ACNC: <3.1 MIU/ML
HBV SURFACE AG SER QL: NONREACTIVE
HCT VFR BLD AUTO: 34.1 %
HGB BLD-MCNC: 11.9 G/DL (ref 13.6–17.2)
IMM GRANULOCYTES # BLD AUTO: 0 K/UL (ref 0–0.5)
IMM GRANULOCYTES NFR BLD AUTO: 1 % (ref 0–5)
LYMPHOCYTES # BLD: 1.3 K/UL (ref 0.5–4.6)
LYMPHOCYTES NFR BLD: 22 % (ref 13–44)
MAGNESIUM SERPL-MCNC: 1.6 MG/DL (ref 1.8–2.4)
MCH RBC QN AUTO: 31.5 PG (ref 26.1–32.9)
MCHC RBC AUTO-ENTMCNC: 34.9 G/DL (ref 31.4–35)
MCV RBC AUTO: 90.2 FL (ref 79.6–97.8)
MONOCYTES # BLD: 0.3 K/UL (ref 0.1–1.3)
MONOCYTES NFR BLD: 5 % (ref 4–12)
NEUTS SEG # BLD: 4 K/UL (ref 1.7–8.2)
NEUTS SEG NFR BLD: 69 % (ref 43–78)
NRBC # BLD: 0 K/UL (ref 0–0.2)
PLATELET # BLD AUTO: 169 K/UL (ref 150–450)
PMV BLD AUTO: 9.5 FL (ref 9.4–12.3)
POTASSIUM SERPL-SCNC: 3.2 MMOL/L (ref 3.5–5.1)
PROT SERPL-MCNC: 6.1 G/DL (ref 6.3–8.2)
RBC # BLD AUTO: 3.78 M/UL (ref 4.23–5.6)
SODIUM SERPL-SCNC: 141 MMOL/L (ref 136–145)
TSH SERPL DL<=0.005 MIU/L-ACNC: 1.11 UIU/ML (ref 0.36–3.74)
URATE SERPL-MCNC: 6.8 MG/DL (ref 2.6–6)
WBC # BLD AUTO: 5.8 K/UL (ref 4.3–11.1)

## 2022-05-10 PROCEDURE — 84550 ASSAY OF BLOOD/URIC ACID: CPT

## 2022-05-10 PROCEDURE — 80053 COMPREHEN METABOLIC PANEL: CPT

## 2022-05-10 PROCEDURE — 74011000258 HC RX REV CODE- 258: Performed by: INTERNAL MEDICINE

## 2022-05-10 PROCEDURE — 36415 COLL VENOUS BLD VENIPUNCTURE: CPT

## 2022-05-10 PROCEDURE — 96413 CHEMO IV INFUSION 1 HR: CPT

## 2022-05-10 PROCEDURE — 74011000250 HC RX REV CODE- 250: Performed by: INTERNAL MEDICINE

## 2022-05-10 PROCEDURE — 84443 ASSAY THYROID STIM HORMONE: CPT

## 2022-05-10 PROCEDURE — 86706 HEP B SURFACE ANTIBODY: CPT

## 2022-05-10 PROCEDURE — 86705 HEP B CORE ANTIBODY IGM: CPT

## 2022-05-10 PROCEDURE — 85025 COMPLETE CBC W/AUTO DIFF WBC: CPT

## 2022-05-10 PROCEDURE — 83735 ASSAY OF MAGNESIUM: CPT

## 2022-05-10 PROCEDURE — 87340 HEPATITIS B SURFACE AG IA: CPT

## 2022-05-10 PROCEDURE — 74011250636 HC RX REV CODE- 250/636: Performed by: INTERNAL MEDICINE

## 2022-05-10 RX ORDER — SODIUM CHLORIDE 0.9 % (FLUSH) 0.9 %
10 SYRINGE (ML) INJECTION AS NEEDED
Status: DISCONTINUED | OUTPATIENT
Start: 2022-05-10 | End: 2022-05-11 | Stop reason: HOSPADM

## 2022-05-10 RX ORDER — DIPHENHYDRAMINE HYDROCHLORIDE 50 MG/ML
50 INJECTION, SOLUTION INTRAMUSCULAR; INTRAVENOUS AS NEEDED
Status: DISCONTINUED | OUTPATIENT
Start: 2022-05-10 | End: 2022-05-11 | Stop reason: HOSPADM

## 2022-05-10 RX ORDER — HYDROCORTISONE SODIUM SUCCINATE 100 MG/2ML
100 INJECTION, POWDER, FOR SOLUTION INTRAMUSCULAR; INTRAVENOUS AS NEEDED
Status: DISCONTINUED | OUTPATIENT
Start: 2022-05-10 | End: 2022-05-11 | Stop reason: HOSPADM

## 2022-05-10 RX ORDER — SODIUM CHLORIDE 9 MG/ML
25 INJECTION, SOLUTION INTRAVENOUS CONTINUOUS
Status: DISCONTINUED | OUTPATIENT
Start: 2022-05-10 | End: 2022-05-11 | Stop reason: HOSPADM

## 2022-05-10 RX ADMIN — SODIUM CHLORIDE 480 MG: 9 INJECTION, SOLUTION INTRAVENOUS at 16:29

## 2022-05-10 RX ADMIN — SODIUM CHLORIDE 25 ML/HR: 900 INJECTION, SOLUTION INTRAVENOUS at 16:15

## 2022-05-10 RX ADMIN — SODIUM CHLORIDE, PRESERVATIVE FREE 10 ML: 5 INJECTION INTRAVENOUS at 16:20

## 2022-05-10 NOTE — PROGRESS NOTES
Arrived to the [facility]. [place] completed. Patient tolerated well. Any issues or concerns during appointment: none. No appts scheduled as of now, pt stated they will call them with future appts. Patient instructed to call provider with temperature of 100.4 or greater or nausea/vomiting/ diarrhea or pain not controlled by medications  Discharged ambulatory accompanied by spouse.

## 2022-05-11 LAB — HBV CORE IGM SERPL QL IA: NEGATIVE

## 2022-06-07 ENCOUNTER — HOSPITAL ENCOUNTER (OUTPATIENT)
Dept: INFUSION THERAPY | Age: 77
Discharge: HOME | End: 2022-06-07
Payer: MEDICARE

## 2022-06-07 ENCOUNTER — HOSPITAL ENCOUNTER (OUTPATIENT)
Dept: LAB | Age: 77
Discharge: HOME | End: 2022-06-10
Payer: MEDICARE

## 2022-06-07 ENCOUNTER — OFFICE VISIT (OUTPATIENT)
Dept: ONCOLOGY | Age: 77
End: 2022-06-07
Payer: MEDICARE

## 2022-06-07 VITALS
TEMPERATURE: 97.8 F | HEIGHT: 67 IN | HEART RATE: 89 BPM | WEIGHT: 131.7 LBS | BODY MASS INDEX: 20.67 KG/M2 | OXYGEN SATURATION: 99 % | RESPIRATION RATE: 16 BRPM | DIASTOLIC BLOOD PRESSURE: 80 MMHG | SYSTOLIC BLOOD PRESSURE: 122 MMHG

## 2022-06-07 DIAGNOSIS — C43.59 MELANOMA OF BACK (HCC): Primary | ICD-10-CM

## 2022-06-07 DIAGNOSIS — C43.59 MELANOMA OF BACK (HCC): ICD-10-CM

## 2022-06-07 LAB
ALBUMIN SERPL-MCNC: 3 G/DL (ref 3.2–4.6)
ALBUMIN/GLOB SERPL: 0.9 {RATIO} (ref 1.2–3.5)
ALP SERPL-CCNC: 103 U/L (ref 50–136)
ALT SERPL-CCNC: 15 U/L (ref 12–65)
ANION GAP SERPL CALC-SCNC: 5 MMOL/L (ref 7–16)
AST SERPL-CCNC: 12 U/L (ref 15–37)
BASOPHILS # BLD: 0.1 K/UL (ref 0–0.2)
BASOPHILS NFR BLD: 1 % (ref 0–2)
BILIRUB SERPL-MCNC: 0.7 MG/DL (ref 0.2–1.1)
BUN SERPL-MCNC: 24 MG/DL (ref 8–23)
CALCIUM SERPL-MCNC: 8.5 MG/DL (ref 8.3–10.4)
CHLORIDE SERPL-SCNC: 106 MMOL/L (ref 98–107)
CO2 SERPL-SCNC: 28 MMOL/L (ref 21–32)
CREAT SERPL-MCNC: 1.7 MG/DL (ref 0.8–1.5)
DIFFERENTIAL METHOD BLD: ABNORMAL
EOSINOPHIL # BLD: 0.2 K/UL (ref 0–0.8)
EOSINOPHIL NFR BLD: 3 % (ref 0.5–7.8)
ERYTHROCYTE [DISTWIDTH] IN BLOOD BY AUTOMATED COUNT: 14.1 % (ref 11.9–14.6)
GLOBULIN SER CALC-MCNC: 3.2 G/DL (ref 2.3–3.5)
GLUCOSE SERPL-MCNC: 191 MG/DL (ref 65–100)
HCT VFR BLD AUTO: 35.3 %
HGB BLD-MCNC: 11.8 G/DL (ref 13.6–17.2)
IMM GRANULOCYTES # BLD AUTO: 0 K/UL (ref 0–0.5)
IMM GRANULOCYTES NFR BLD AUTO: 1 % (ref 0–5)
LYMPHOCYTES # BLD: 1.1 K/UL (ref 0.5–4.6)
LYMPHOCYTES NFR BLD: 20 % (ref 13–44)
MAGNESIUM SERPL-MCNC: 2.2 MG/DL (ref 1.8–2.4)
MCH RBC QN AUTO: 30.5 PG (ref 26.1–32.9)
MCHC RBC AUTO-ENTMCNC: 33.4 G/DL (ref 31.4–35)
MCV RBC AUTO: 91.2 FL (ref 79.6–97.8)
MONOCYTES # BLD: 0.2 K/UL (ref 0.1–1.3)
MONOCYTES NFR BLD: 4 % (ref 4–12)
NEUTS SEG # BLD: 4.1 K/UL (ref 1.7–8.2)
NEUTS SEG NFR BLD: 72 % (ref 43–78)
NRBC # BLD: 0 K/UL (ref 0–0.2)
PLATELET # BLD AUTO: 192 K/UL (ref 150–450)
PMV BLD AUTO: 8.9 FL (ref 9.4–12.3)
POTASSIUM SERPL-SCNC: 3.4 MMOL/L (ref 3.5–5.1)
PROT SERPL-MCNC: 6.2 G/DL (ref 6.3–8.2)
RBC # BLD AUTO: 3.87 M/UL (ref 4.23–5.6)
SODIUM SERPL-SCNC: 139 MMOL/L (ref 136–145)
TSH, 3RD GENERATION: 1.5 UIU/ML (ref 0.36–3)
URATE SERPL-MCNC: 6.8 MG/DL (ref 2.6–6)
WBC # BLD AUTO: 5.7 K/UL (ref 4.3–11.1)

## 2022-06-07 PROCEDURE — 85025 COMPLETE CBC W/AUTO DIFF WBC: CPT

## 2022-06-07 PROCEDURE — 96413 CHEMO IV INFUSION 1 HR: CPT

## 2022-06-07 PROCEDURE — 1123F ACP DISCUSS/DSCN MKR DOCD: CPT | Performed by: NURSE PRACTITIONER

## 2022-06-07 PROCEDURE — G8428 CUR MEDS NOT DOCUMENT: HCPCS | Performed by: NURSE PRACTITIONER

## 2022-06-07 PROCEDURE — 99214 OFFICE O/P EST MOD 30 MIN: CPT | Performed by: NURSE PRACTITIONER

## 2022-06-07 PROCEDURE — 2580000003 HC RX 258: Performed by: NURSE PRACTITIONER

## 2022-06-07 PROCEDURE — 80053 COMPREHEN METABOLIC PANEL: CPT

## 2022-06-07 PROCEDURE — 84443 ASSAY THYROID STIM HORMONE: CPT

## 2022-06-07 PROCEDURE — 36415 COLL VENOUS BLD VENIPUNCTURE: CPT

## 2022-06-07 PROCEDURE — 83735 ASSAY OF MAGNESIUM: CPT

## 2022-06-07 PROCEDURE — 84550 ASSAY OF BLOOD/URIC ACID: CPT

## 2022-06-07 PROCEDURE — G8420 CALC BMI NORM PARAMETERS: HCPCS | Performed by: NURSE PRACTITIONER

## 2022-06-07 PROCEDURE — 6360000002 HC RX W HCPCS: Performed by: NURSE PRACTITIONER

## 2022-06-07 PROCEDURE — 4004F PT TOBACCO SCREEN RCVD TLK: CPT | Performed by: NURSE PRACTITIONER

## 2022-06-07 RX ORDER — EPINEPHRINE 1 MG/ML
0.3 INJECTION, SOLUTION, CONCENTRATE INTRAVENOUS PRN
Status: CANCELLED | OUTPATIENT
Start: 2022-06-07

## 2022-06-07 RX ORDER — HEPARIN SODIUM (PORCINE) LOCK FLUSH IV SOLN 100 UNIT/ML 100 UNIT/ML
500 SOLUTION INTRAVENOUS PRN
Status: CANCELLED | OUTPATIENT
Start: 2022-06-07

## 2022-06-07 RX ORDER — SODIUM CHLORIDE 9 MG/ML
INJECTION, SOLUTION INTRAVENOUS CONTINUOUS
Status: CANCELLED | OUTPATIENT
Start: 2022-06-07

## 2022-06-07 RX ORDER — SODIUM CHLORIDE 9 MG/ML
5-250 INJECTION, SOLUTION INTRAVENOUS PRN
Status: CANCELLED | OUTPATIENT
Start: 2022-06-07

## 2022-06-07 RX ORDER — ONDANSETRON 2 MG/ML
8 INJECTION INTRAMUSCULAR; INTRAVENOUS
Status: CANCELLED | OUTPATIENT
Start: 2022-06-07

## 2022-06-07 RX ORDER — ALBUTEROL SULFATE 90 UG/1
4 AEROSOL, METERED RESPIRATORY (INHALATION) PRN
Status: CANCELLED | OUTPATIENT
Start: 2022-06-07

## 2022-06-07 RX ORDER — ONDANSETRON 2 MG/ML
8 INJECTION INTRAMUSCULAR; INTRAVENOUS
Status: ACTIVE | OUTPATIENT
Start: 2022-06-07 | End: 2022-06-07

## 2022-06-07 RX ORDER — SODIUM CHLORIDE 9 MG/ML
5-40 INJECTION INTRAVENOUS PRN
Status: CANCELLED | OUTPATIENT
Start: 2022-06-07

## 2022-06-07 RX ORDER — FAMOTIDINE 10 MG/ML
20 INJECTION, SOLUTION INTRAVENOUS
Status: CANCELLED | OUTPATIENT
Start: 2022-06-07

## 2022-06-07 RX ORDER — SODIUM CHLORIDE 0.9 % (FLUSH) 0.9 %
5-40 SYRINGE (ML) INJECTION PRN
Status: DISCONTINUED | OUTPATIENT
Start: 2022-06-07 | End: 2022-06-08 | Stop reason: HOSPADM

## 2022-06-07 RX ORDER — ACETAMINOPHEN 325 MG/1
650 TABLET ORAL
Status: CANCELLED | OUTPATIENT
Start: 2022-06-07

## 2022-06-07 RX ORDER — SODIUM CHLORIDE 9 MG/ML
5-250 INJECTION, SOLUTION INTRAVENOUS PRN
Status: DISCONTINUED | OUTPATIENT
Start: 2022-06-07 | End: 2022-06-08 | Stop reason: HOSPADM

## 2022-06-07 RX ORDER — SODIUM CHLORIDE 0.9 % (FLUSH) 0.9 %
5-40 SYRINGE (ML) INJECTION PRN
Status: CANCELLED | OUTPATIENT
Start: 2022-06-07

## 2022-06-07 RX ORDER — DIPHENHYDRAMINE HYDROCHLORIDE 50 MG/ML
50 INJECTION INTRAMUSCULAR; INTRAVENOUS
Status: CANCELLED | OUTPATIENT
Start: 2022-06-07

## 2022-06-07 RX ORDER — MEPERIDINE HYDROCHLORIDE 50 MG/ML
12.5 INJECTION INTRAMUSCULAR; INTRAVENOUS; SUBCUTANEOUS PRN
Status: CANCELLED | OUTPATIENT
Start: 2022-06-07

## 2022-06-07 RX ADMIN — SODIUM CHLORIDE, PRESERVATIVE FREE 10 ML: 5 INJECTION INTRAVENOUS at 15:30

## 2022-06-07 RX ADMIN — SODIUM CHLORIDE 25 ML/HR: 9 INJECTION, SOLUTION INTRAVENOUS at 15:30

## 2022-06-07 RX ADMIN — SODIUM CHLORIDE 480 MG: 9 INJECTION, SOLUTION INTRAVENOUS at 15:58

## 2022-06-07 NOTE — PROGRESS NOTES
Patient arrived to [facility] for [address] [facility]. Assessment completed. No needs voiced at this time. Patent tolerated infusion well and is aware of next appointment on 7/5/2022 @1420. Patient discharged ambulatory.

## 2022-06-07 NOTE — PROGRESS NOTES
Dr. Kurt Clements and a PET scan was performed on February 28, 2022. This study showed  an enlarged right supraclavicular lymph node measuring 1.7 cm in short axis with an SUV of 6.8. There were no other enlarged nodes and no visceral findings of metastatic disease. He has lost approximately 30 pounds and has had a diminished appetite. The patient's tumor was  assessed and did have a BRAF mutation. Unfortunately however the mutation was not in the conventional V600E location. The tumor had no PD-L1 expression but did have a high tumor mutational burden. Here today for follow up and consideration of next opdivo. He continues to do well. He had a tooth pulled about 2 weeks ago and has been recovering well. He has dentures now. He has no infectious symptoms. No GI complaints. Tolerating treatment well. Medications:     Current Outpatient Medications          Medication  Sig  Dispense  Refill            acetaminophen (Tylenol Extra Strength) 500 mg tablet  Take 1,000 mg by mouth every six (6) hours as needed for Pain.   ondansetron (ZOFRAN ODT) 4 mg disintegrating tablet  Take 1 Tablet by mouth every eight (8) hours as needed for Nausea or Vomiting. 8 Tablet  0            cephALEXin (KEFLEX) 500 mg capsule  Take 1 Capsule by mouth four (4) times daily. 28 Capsule  0      cholecalciferol (Vitamin D3) (1000 Units /25 mcg) tablet  Take 1,000 Units by mouth daily.   ondansetron hcl (ZOFRAN) 8 mg tablet  Take 1 Tablet by mouth every eight (8) hours as needed for Nausea or Vomiting.  Indications: nausea with immunotherapy  90 Tablet  1      allopurinoL (ZYLOPRIM) 100 mg tablet  TAKE 1 TABLET BY MOUTH DAILY  90 Tablet  5      chlorthalidone (HYGROTON) 25 mg tablet  TAKE 1 TABLET BY MOUTH DAILY FOR HIGH BLOOD PRESSURE  90 Tablet  3      LORazepam (ATIVAN) 1 mg tablet  Take 1 tab twice a day as needed for anxiety  40 Tablet  1      tamsulosin (FLOMAX) 0.4 mg capsule  TAKE 1 CAPSULE BY MOUTH DAILY  90 Capsule  3      metoprolol succinate (TOPROL-XL) 100 mg tablet  TAKE ONE TABLET BY MOUTH ONCE DAILY. Indications: ventricular rate control in atrial fibrillation  90 Tablet  3      escitalopram oxalate (LEXAPRO) 20 mg tablet  Take 1 Tablet by mouth daily. 30 Tablet  11      amLODIPine (NORVASC) 10 mg tablet  TAKE ONE TABLET BY MOUTH ONCE DAILY. 90 Tablet  3      albuterol (Ventolin HFA) 90 mcg/actuation inhaler  Take 2 Puffs by inhalation every four (4) hours as needed for Shortness of Breath. 1 Each  5      pravastatin (PRAVACHOL) 40 mg tablet  TAKE 1 TABLET BY MOUTH EVERY NIGHT  90 Tablet  3      budesonide-formoteroL (Symbicort) 160-4.5 mcg/actuation HFAA  Take 2 Puffs by inhalation two (2) times a day. 1 Each  11            magnesium oxide (MAG-OX) 400 mg tablet  Take 1 Tab by mouth daily. 1 Tab  0        Facility-Administered Medications Ordered in Other Visits             Medication  Dose  Route  Frequency  Provider  Last Rate  Last Admin               0.9% sodium chloride infusion   25 mL/hr  IntraVENous  CONTINUOUS  Leroy Bishop MD            nivolumab (OPDIVO) 480 mg in 0.9% sodium chloride 100 mL IVPB   480 mg  IntraVENous  ONCE  Leroy Bishop MD                     sodium chloride (NS) flush 10 mL   10 mL  IntraVENous  PRN  Leroy Bishop MD                     famotidine (PF) (PEPCID) 20 mg in 0.9% sodium chloride 10 mL injection   20 mg  IntraVENous  PRN  Leroy Bishop MD            hydrocortisone Sod Succ (PF) (SOLU-CORTEF) injection 100 mg   100 mg  IntraVENous  PRN  Leroy Bishop MD                     diphenhydrAMINE (BENADRYL) injection 50 mg   50 mg  IntraVENous  PRN  Leroy Bishop MD                Allergies: Allergies        Allergen  Reactions           Pollen Extracts  Unknown (comments)           Review of Systems: The Review of Systems is documented in full in the internal medical record.  All systems are negative other than for those noted above. Past Medical History:    Past Medical History:        Diagnosis  Date           Adverse effect of anesthesia            Had problems urinating after last general anesthesia            Arthritis         Chronic obstructive pulmonary disease (HCC)            daily and prn inhaler, Followed by Dr. Dalia Aguilar  Depression            anxiety, treated with med           Ear problems         Erectile disorder due to medical condition in male patient         GERD (gastroesophageal reflux disease)            managed with meds            Gout         Hearing reduced            has hearing aids, but wears them infrequently           Heart murmur            Echo 4/2021- moderate aortic stenosis, EF 50-55%           History of colon polyps            ~2015           History of kidney stones            1-2 episodes years ago.            [place] (hard of hearing)         Hyperlipidemia            on no med           Hypertension            Managed with meds            Melanoma ([address]         Moderate aortic stenosis by prior echocardiogram  04/2021       Peripheral vascular disease ([facility]         Psoriasis             Transient ischemic attack            patient denies 6/26/20           Past Surgical History:    Past Surgical History:         Procedure  Laterality  Date            COLONOSCOPY  N/A  7/6/2020          COLONOSCOPY/BMI 23 performed [facility]acy [facility]COPY            HX ACL RECONSTRUCTION           HX BACK SURGERY           HX HEENT  Right  05/06/2022          [facility]            HX LITHOTRIPSY                HX MALIGNANT SKIN LESION EXCISION               Social History:    Social History         Tobacco Use           Smoking status:  Current Some Day Smoker              Packs/day:  0.25         Years:  40.00         Pack years:  10.00         Types:  Cigarettes           Smokeless tobacco:  Never Used        Burden Tobacco comment: currently smoking 2-3 cigarettes a day       Vaping Use           Vaping Use:  Never used       Substance Use Topics           Alcohol use:  No           Drug use:  No           Family History:    Family History         Problem  Relation  Age of Onset            Hypertension  Mother         Kidney Disease  Mother         Hypertension  Father              Stroke  Father                Physical Examination:   General Appearance: Mildly ill appearing patient in no acute distress. Vital signs:    Visit Vitals  Vitals:    06/07/22 1433   BP: 122/80   Pulse: 89   Resp: 16   Temp: 97.8 °F (36.6 °C)   SpO2: 99%   Weight: 131 lb 11.2 oz (59.7 kg)   Height: 5' 7\" (1.702 m)        Performance Status: ECOG Level 1   Distress Screening Score:  PHQ Scores 3/15/2022 3/11/2022 3/9/2022 3/9/2021   PHQ2 Score 0 1 0 0     Interpretation of Total Score Depression Severity: 1-4 = Minimal depression, 5-9 = Mild depression, 10-14 = Moderate depression, 15-19 = Moderately severe depression, 20-27 = Severe depression        Pain Scale: 0 - No pain/10         HEENT: No oral exam performed. Neck: Supple. There is no thyromegaly. Lymph nodes: deferred     Lungs: The lungs are notable for scattered rhonchi. There is no egophony. There is no chest wall tenderness and no  use of accessory respiratory musculature. Heart: There is no jugular venous distention. The rate is normal and rhythm regular. The S1 and S2 are normal and there are no murmurs or rubs. Abdomen: Soft, non-tender, bowel sounds present and normal, no appreciated hepatosplenomegaly. No palpable masses. Extremities: No cyanosis, clubbing or edema.            Labs/Imaging:  Hospital Outpatient Visit on 06/07/2022   Component Date Value Ref Range Status    WBC 06/07/2022 5.7  4.3 - 11.1 K/uL Final    RBC 06/07/2022 3.87* 4.23 - 5.6 M/uL Final    Hemoglobin 06/07/2022 11.8* 13.6 - 17.2 g/dL Final    Hematocrit 06/07/2022 35.3  % Final    MCV 06/07/2022 91.2  79.6 - 97.8 FL Final    MCH 06/07/2022 30.5  26.1 - 32.9 PG Final    MCHC 06/07/2022 33.4  31.4 - 35.0 g/dL Final    RDW 06/07/2022 14.1  11.9 - 14.6 % Final    Platelets 03/32/1640 192  150 - 450 K/uL Final    MPV 06/07/2022 8.9* 9.4 - 12.3 FL Final    nRBC 06/07/2022 0.00  0.0 - 0.2 K/uL Final    **Note: Absolute NRBC parameter is now reported with Hemogram**    Differential Type 06/07/2022 AUTOMATED    Final    Seg Neutrophils 06/07/2022 72  43 - 78 % Final    Lymphocytes 06/07/2022 20  13 - 44 % Final    Monocytes 06/07/2022 4  4.0 - 12.0 % Final    Eosinophils % 06/07/2022 3  0.5 - 7.8 % Final    Basophils 06/07/2022 1  0.0 - 2.0 % Final    Immature Granulocytes 06/07/2022 1  0.0 - 5.0 % Final    Segs Absolute 06/07/2022 4.1  1.7 - 8.2 K/UL Final    Absolute Lymph # 06/07/2022 1.1  0.5 - 4.6 K/UL Final    Absolute Mono # 06/07/2022 0.2  0.1 - 1.3 K/UL Final    Absolute Eos # 06/07/2022 0.2  0.0 - 0.8 K/UL Final    Basophils Absolute 06/07/2022 0.1  0.0 - 0.2 K/UL Final    Absolute Immature Granulocyte 06/07/2022 0.0  0.0 - 0.5 K/UL Final    Sodium 06/07/2022 139  136 - 145 mmol/L Final    Potassium 06/07/2022 3.4* 3.5 - 5.1 mmol/L Final    Chloride 06/07/2022 106  98 - 107 mmol/L Final    CO2 06/07/2022 28  21 - 32 mmol/L Final    Anion Gap 06/07/2022 5* 7 - 16 mmol/L Final    Glucose 06/07/2022 191* 65 - 100 mg/dL Final    BUN 06/07/2022 24* 8 - 23 MG/DL Final    CREATININE 06/07/2022 1.70* 0.8 - 1.5 MG/DL Final    GFR  06/07/2022 51* >60 ml/min/1.73m2 Final    GFR Non- 06/07/2022 42* >60 ml/min/1.73m2 Final    Comment:      Estimated GFR is calculated using the Modification of Diet in Renal Disease (MDRD) Study equation, reported for both  Americans (GFRAA) and non- Americans (GFRNA), and normalized to 1.73m2 body surface area. The physician must decide which value applies to the patient.   The MDRD study equation should only be used in individuals age 25 or older. It has not been validated for the following: pregnant women, patients with serious comorbid conditions,or on certain medications, or persons with extremes of body size, muscle mass, or nutritional status.  Calcium 06/07/2022 8.5  8.3 - 10.4 MG/DL Final    Total Bilirubin 06/07/2022 0.7  0.2 - 1.1 MG/DL Final    ALT 06/07/2022 15  12 - 65 U/L Final    AST 06/07/2022 12* 15 - 37 U/L Final    Alk Phosphatase 06/07/2022 103  50 - 136 U/L Final    Total Protein 06/07/2022 6.2* 6.3 - 8.2 g/dL Final    Albumin 06/07/2022 3.0* 3.2 - 4.6 g/dL Final    Globulin 06/07/2022 3.2  2.3 - 3.5 g/dL Final    Albumin/Globulin Ratio 06/07/2022 0.9* 1.2 - 3.5   Final    Uric Acid 06/07/2022 6.8* 2.6 - 6.0 MG/DL Final    Magnesium 06/07/2022 2.2  1.8 - 2.4 mg/dL Final    TSH, 3RD GENERATION 06/07/2022 1.500  0.358 - 3 uIU/mL Final          ASSESSMENT:  This gentleman had a rather substantial melanoma resected from his right upper back. The lymphoscintigraphy suggested a sentinel node in the right supraclavicular region. This was visualized on the  PET scan as well. He underwent an excision of this lymph node which was positive for metastatic melanoma. He does have a BRAF mutation but it is not in the typical V600E and less likely to respond to targeted therapy. Given the above he agrees to proceed  with nivolumab adjuvant therapy.       PLAN:   He will begin treatment today with a dose of nivolumab of 480 mg.         RESUSCITATION DIRECTIVES/HOSPICE CARE;   Full Support             Mary Zarate, [address] [place] [address] Hematology and Oncology/Radiation Oncology   [address] [place] [address]  Office : [phone]  Fax : [phone]

## 2022-06-15 ENCOUNTER — OFFICE VISIT (OUTPATIENT)
Dept: INTERNAL MEDICINE CLINIC | Facility: CLINIC | Age: 77
End: 2022-06-15
Payer: MEDICARE

## 2022-06-15 VITALS
SYSTOLIC BLOOD PRESSURE: 139 MMHG | TEMPERATURE: 99.1 F | WEIGHT: 130 LBS | DIASTOLIC BLOOD PRESSURE: 72 MMHG | BODY MASS INDEX: 20.4 KG/M2 | HEIGHT: 67 IN | RESPIRATION RATE: 16 BRPM | HEART RATE: 78 BPM

## 2022-06-15 DIAGNOSIS — R41.0 CONFUSION: ICD-10-CM

## 2022-06-15 DIAGNOSIS — C43.9 METASTATIC MELANOMA (HCC): ICD-10-CM

## 2022-06-15 DIAGNOSIS — N18.31 STAGE 3A CHRONIC KIDNEY DISEASE (HCC): Primary | ICD-10-CM

## 2022-06-15 DIAGNOSIS — C43.59 MELANOMA OF BACK (HCC): ICD-10-CM

## 2022-06-15 DIAGNOSIS — J44.9 COPD, SEVERE (HCC): ICD-10-CM

## 2022-06-15 PROBLEM — N18.9 CHRONIC KIDNEY DISEASE: Status: RESOLVED | Noted: 2018-11-01 | Resolved: 2022-06-15

## 2022-06-15 PROBLEM — N18.30 CHRONIC RENAL DISEASE, STAGE III (HCC): Status: ACTIVE | Noted: 2022-06-15

## 2022-06-15 PROCEDURE — G8427 DOCREV CUR MEDS BY ELIG CLIN: HCPCS | Performed by: INTERNAL MEDICINE

## 2022-06-15 PROCEDURE — G8420 CALC BMI NORM PARAMETERS: HCPCS | Performed by: INTERNAL MEDICINE

## 2022-06-15 PROCEDURE — 1123F ACP DISCUSS/DSCN MKR DOCD: CPT | Performed by: INTERNAL MEDICINE

## 2022-06-15 PROCEDURE — 99213 OFFICE O/P EST LOW 20 MIN: CPT | Performed by: INTERNAL MEDICINE

## 2022-06-15 PROCEDURE — 4004F PT TOBACCO SCREEN RCVD TLK: CPT | Performed by: INTERNAL MEDICINE

## 2022-06-15 PROCEDURE — 3023F SPIROM DOC REV: CPT | Performed by: INTERNAL MEDICINE

## 2022-06-15 SDOH — ECONOMIC STABILITY: FOOD INSECURITY: WITHIN THE PAST 12 MONTHS, YOU WORRIED THAT YOUR FOOD WOULD RUN OUT BEFORE YOU GOT MONEY TO BUY MORE.: NEVER TRUE

## 2022-06-15 SDOH — ECONOMIC STABILITY: FOOD INSECURITY: WITHIN THE PAST 12 MONTHS, THE FOOD YOU BOUGHT JUST DIDN'T LAST AND YOU DIDN'T HAVE MONEY TO GET MORE.: NEVER TRUE

## 2022-06-15 ASSESSMENT — PATIENT HEALTH QUESTIONNAIRE - PHQ9
SUM OF ALL RESPONSES TO PHQ QUESTIONS 1-9: 1
SUM OF ALL RESPONSES TO PHQ9 QUESTIONS 1 & 2: 1
2. FEELING DOWN, DEPRESSED OR HOPELESS: 1
1. LITTLE INTEREST OR PLEASURE IN DOING THINGS: 0
SUM OF ALL RESPONSES TO PHQ QUESTIONS 1-9: 1

## 2022-06-15 ASSESSMENT — SOCIAL DETERMINANTS OF HEALTH (SDOH): HOW HARD IS IT FOR YOU TO PAY FOR THE VERY BASICS LIKE FOOD, HOUSING, MEDICAL CARE, AND HEATING?: NOT HARD AT ALL

## 2022-06-15 NOTE — PROGRESS NOTES
6/15/2022 5:40 PM  Location:[facility] [address] [facility]  [place]  Patient #:  [id number]  YOB: 1945          YOUR LAST HEMOGLOBIN A1CS:   No results found for: HBA1C, [id number]    YOUR LAST LIPID PROFILE:   Lab Results   Component Value Date    CHOL 119 04/27/2021    HDL 34 04/27/2021    VLDL 18 04/27/2021         Lab Results   Component Value Date    GFRAA 51 06/07/2022    BUN 24 06/07/2022     06/07/2022    K 3.4 06/07/2022     06/07/2022    CO2 28 06/07/2022           History of Present Illness     Chief Complaint   Patient presents with    Nausea     nauseated     Diarrhea     started having diarrhea yesterday     This patient is having some diarrhea which started yesterday. He is undergoing treatment for metastatic melanoma by oncology. He did have some confusion earlier but this is resolving. He is not taking any type of supplement such as Ensure and has lost weight over the past few months  Mr. Glenroy Faye is a 69 y.o. male  who presents for office visit      No Known Allergies  Past Medical History:   Diagnosis Date    Adverse effect of anesthesia     Had problems urinating after last general anesthesia     Arthritis     Chronic obstructive pulmonary disease (HCC)     daily and prn inhaler, Followed by Dr. Ross Perez Depression     anxiety, treated with med    Ear problems     Erectile disorder due to medical condition in male patient     GERD (gastroesophageal reflux disease)     managed with meds     Gout     Hearing reduced     has hearing aids, but wears them infrequently    Heart murmur     Echo 4/2021- moderate aortic stenosis, EF 50-55%    History of colon polyps     History of kidney stones     1-2 episodes years ago.     [place] (hard of hearing)     Hyperlipidemia     on no med    Hypertension     Managed with meds     Melanoma ([address]     Moderate aortic stenosis by prior echocardiogram 04/2021    Peripheral vascular disease ([address]  Psoriasis     Transient ischemic attack     patient denies 6/26/20     Social History     Socioeconomic History    Marital status:      Spouse name: None    Number of children: None    Years of education: None    Highest education level: None   Occupational History    None   Tobacco Use    Smoking status: Current Some Day Smoker     Packs/day: 0.25    Smokeless tobacco: Never Used    Tobacco comment: Quit smoking: currently smoking 2-3 cigarettes a day   Substance and Sexual Activity    Alcohol use: No    Drug use: No    Sexual activity: None   Other Topics Concern    None   Social History Narrative     and lives with wife. No pets. Social Determinants of Health     Financial Resource Strain: Low Risk     Difficulty of Paying Living Expenses: Not hard at all   Food Insecurity: No Food Insecurity    Worried About Running Out of Food in the Last Year: Never true    [place] of Food in the Last Year: Never true   Transportation Needs:     Lack of Transportation (Medical): Not on file    Lack of Transportation (Non-Medical):  Not on file   Physical Activity:     Days of Exercise per Week: Not on file    Minutes of Exercise per Session: Not on file   Stress:     Feeling of Stress : Not on file   Social Connections:     Frequency of Communication with Friends and Family: Not on file    Frequency of Social Gatherings with Friends and Family: Not on file    Attends Yarsani Services: Not on file    Active Member of Clubs or Organizations: Not on file    Attends Club or Organization Meetings: Not on file    Marital Status: Not on file   Intimate Partner Violence:     Fear of Current or Ex-Partner: Not on file    Emotionally Abused: Not on file    Physically Abused: Not on file    Sexually Abused: Not on file   Housing Stability:     Unable to Pay for Housing in the Last Year: Not on file    Number of [place] in the Last Year: Not on file    Unstable Housing in the Last Year: Not on file     Past Surgical History:   Procedure Laterality Date    ANTERIOR CRUCIATE LIGAMENT REPAIR      BACK SURGERY      COLONOSCOPY N/A 7/6/2020    COLONOSCOPY/BMI 23 performed [facility] Hero Celeste [facility]    HEENT Right 05/06/2022    excisional bx of R supra-clavicular neck mass- [facility]    LITHOTRIPSY      MALIGNANT SKIN LESION EXCISION       Current Outpatient Medications   Medication Sig Dispense Refill    albuterol sulfate  (90 Base) MCG/ACT inhaler Inhale 2 puffs into the lungs every 4 hours as needed      allopurinol (ZYLOPRIM) 100 MG tablet Take 100 mg by mouth daily      amLODIPine (NORVASC) 10 MG tablet TAKE ONE TABLET BY MOUTH ONCE DAILY.  budesonide-formoterol (SYMBICORT) 160-4.5 MCG/ACT AERO Inhale 2 puffs into the lungs 2 times daily      chlorthalidone (HYGROTON) 25 MG tablet TAKE 1 TABLET BY MOUTH DAILY FOR HIGH BLOOD PRESSURE      escitalopram (LEXAPRO) 20 MG tablet Take 20 mg by mouth daily      LORazepam (ATIVAN) 1 MG tablet Take 1 tab twice a day as needed for anxiety      magnesium oxide (MAG-OX) 400 MG tablet Take 400 mg by mouth daily      metoprolol succinate (TOPROL XL) 100 MG extended release tablet TAKE ONE TABLET BY MOUTH ONCE DAILY. Indications: ventricular rate control in atrial fibrillation      pravastatin (PRAVACHOL) 40 MG tablet TAKE 1 TABLET BY MOUTH EVERY NIGHT      tamsulosin (FLOMAX) 0.4 MG capsule Take 0.4 mg by mouth daily       No current facility-administered medications for this visit.      Health Maintenance   Topic Date Due    Lipids  Never done    DTaP/Tdap/Td vaccine (1 - Tdap) Never done    Shingles vaccine (1 of 2) Never done   [employer] Visit (AWV)  03/12/2023    Depression Monitoring  03/15/2023    Flu vaccine  Completed    Pneumococcal 65+ years Vaccine  Completed    COVID-19 Vaccine  Completed    Hepatitis C screen  Completed    Hepatitis A vaccine  Aged Out    Hepatitis B vaccine  Aged Out chlorthalidone (HYGROTON) 25 MG tablet TAKE 1 TABLET BY MOUTH DAILY FOR HIGH BLOOD PRESSURE      escitalopram (LEXAPRO) 20 MG tablet Take 20 mg by mouth daily      LORazepam (ATIVAN) 1 MG tablet Take 1 tab twice a day as needed for anxiety      magnesium oxide (MAG-OX) 400 MG tablet Take 400 mg by mouth daily      metoprolol succinate (TOPROL XL) 100 MG extended release tablet TAKE ONE TABLET BY MOUTH ONCE DAILY. Indications: ventricular rate control in atrial fibrillation      pravastatin (PRAVACHOL) 40 MG tablet TAKE 1 TABLET BY MOUTH EVERY NIGHT      tamsulosin (FLOMAX) 0.4 MG capsule Take 0.4 mg by mouth daily       No current facility-administered medications for this visit. No orders of the defined types were placed in this encounter. There are no discontinued medications. 1. Confusion  Resolving    2. Stage 3a chronic kidney disease ([address]  Able on previous lab work    3. Melanoma of back ([address]  Followed by oncology    4. COPD, severe ([address]  Stable    5. Metastatic melanoma ([facility]  Followed by oncology    6. Hypertension  controlled  No follow-up provider specified.         Alycia Leiva MD

## 2022-07-01 DIAGNOSIS — C43.59 MELANOMA OF BACK (HCC): Primary | ICD-10-CM

## 2022-07-01 DIAGNOSIS — R68.89 OTHER GENERAL SYMPTOMS AND SIGNS: ICD-10-CM

## 2022-07-05 ENCOUNTER — OFFICE VISIT (OUTPATIENT)
Dept: ONCOLOGY | Age: 77
End: 2022-07-05
Payer: MEDICARE

## 2022-07-05 ENCOUNTER — HOSPITAL ENCOUNTER (OUTPATIENT)
Dept: LAB | Age: 77
Discharge: HOME | End: 2022-07-08
Payer: MEDICARE

## 2022-07-05 ENCOUNTER — HOSPITAL ENCOUNTER (OUTPATIENT)
Dept: INFUSION THERAPY | Age: 77
Discharge: HOME | End: 2022-07-05
Payer: MEDICARE

## 2022-07-05 VITALS
RESPIRATION RATE: 12 BRPM | DIASTOLIC BLOOD PRESSURE: 65 MMHG | TEMPERATURE: 98 F | HEART RATE: 62 BPM | OXYGEN SATURATION: 97 % | HEIGHT: 67 IN | BODY MASS INDEX: 20.4 KG/M2 | WEIGHT: 130 LBS | SYSTOLIC BLOOD PRESSURE: 114 MMHG

## 2022-07-05 DIAGNOSIS — R68.89 OTHER GENERAL SYMPTOMS AND SIGNS: ICD-10-CM

## 2022-07-05 DIAGNOSIS — C43.59 MELANOMA OF BACK (HCC): ICD-10-CM

## 2022-07-05 DIAGNOSIS — C43.59 MELANOMA OF BACK (HCC): Primary | ICD-10-CM

## 2022-07-05 LAB
ALBUMIN SERPL-MCNC: 2.9 G/DL (ref 3.2–4.6)
ALBUMIN/GLOB SERPL: 0.9 {RATIO} (ref 1.2–3.5)
ALP SERPL-CCNC: 122 U/L (ref 50–136)
ALT SERPL-CCNC: 23 U/L (ref 12–65)
ANION GAP SERPL CALC-SCNC: 4 MMOL/L (ref 7–16)
AST SERPL-CCNC: 18 U/L (ref 15–37)
BASOPHILS # BLD: 0.1 K/UL (ref 0–0.2)
BASOPHILS NFR BLD: 1 % (ref 0–2)
BILIRUB SERPL-MCNC: 0.6 MG/DL (ref 0.2–1.1)
BUN SERPL-MCNC: 26 MG/DL (ref 8–23)
CALCIUM SERPL-MCNC: 8.6 MG/DL (ref 8.3–10.4)
CHLORIDE SERPL-SCNC: 110 MMOL/L (ref 98–107)
CO2 SERPL-SCNC: 28 MMOL/L (ref 21–32)
CORTIS BS SERPL-MCNC: 14.2 UG/DL
CREAT SERPL-MCNC: 2 MG/DL (ref 0.8–1.5)
DIFFERENTIAL METHOD BLD: ABNORMAL
EOSINOPHIL # BLD: 0.1 K/UL (ref 0–0.8)
EOSINOPHIL NFR BLD: 3 % (ref 0.5–7.8)
ERYTHROCYTE [DISTWIDTH] IN BLOOD BY AUTOMATED COUNT: 14 % (ref 11.9–14.6)
GLOBULIN SER CALC-MCNC: 3.3 G/DL (ref 2.3–3.5)
GLUCOSE SERPL-MCNC: 131 MG/DL (ref 65–100)
HCT VFR BLD AUTO: 34.4 %
HGB BLD-MCNC: 11.3 G/DL (ref 13.6–17.2)
IMM GRANULOCYTES # BLD AUTO: 0 K/UL (ref 0–0.5)
IMM GRANULOCYTES NFR BLD AUTO: 0 % (ref 0–5)
LYMPHOCYTES # BLD: 1.2 K/UL (ref 0.5–4.6)
LYMPHOCYTES NFR BLD: 23 % (ref 13–44)
MAGNESIUM SERPL-MCNC: 1.9 MG/DL (ref 1.8–2.4)
MCH RBC QN AUTO: 30.4 PG (ref 26.1–32.9)
MCHC RBC AUTO-ENTMCNC: 32.8 G/DL (ref 31.4–35)
MCV RBC AUTO: 92.5 FL (ref 79.6–97.8)
MONOCYTES # BLD: 0.3 K/UL (ref 0.1–1.3)
MONOCYTES NFR BLD: 5 % (ref 4–12)
NEUTS SEG # BLD: 3.4 K/UL (ref 1.7–8.2)
NEUTS SEG NFR BLD: 67 % (ref 43–78)
NRBC # BLD: 0 K/UL (ref 0–0.2)
PLATELET # BLD AUTO: 181 K/UL (ref 150–450)
PMV BLD AUTO: 9.2 FL (ref 9.4–12.3)
POTASSIUM SERPL-SCNC: 3.8 MMOL/L (ref 3.5–5.1)
PROT SERPL-MCNC: 6.2 G/DL (ref 6.3–8.2)
RBC # BLD AUTO: 3.72 M/UL (ref 4.23–5.6)
SODIUM SERPL-SCNC: 142 MMOL/L (ref 136–145)
TSH, 3RD GENERATION: 1.81 UIU/ML (ref 0.36–3.74)
URATE SERPL-MCNC: 6.7 MG/DL (ref 2.6–6)
WBC # BLD AUTO: 5 K/UL (ref 4.3–11.1)

## 2022-07-05 PROCEDURE — 80053 COMPREHEN METABOLIC PANEL: CPT

## 2022-07-05 PROCEDURE — 36415 COLL VENOUS BLD VENIPUNCTURE: CPT

## 2022-07-05 PROCEDURE — 84443 ASSAY THYROID STIM HORMONE: CPT

## 2022-07-05 PROCEDURE — 99214 OFFICE O/P EST MOD 30 MIN: CPT | Performed by: INTERNAL MEDICINE

## 2022-07-05 PROCEDURE — G8420 CALC BMI NORM PARAMETERS: HCPCS | Performed by: INTERNAL MEDICINE

## 2022-07-05 PROCEDURE — 96413 CHEMO IV INFUSION 1 HR: CPT

## 2022-07-05 PROCEDURE — 6360000002 HC RX W HCPCS: Performed by: INTERNAL MEDICINE

## 2022-07-05 PROCEDURE — 84550 ASSAY OF BLOOD/URIC ACID: CPT

## 2022-07-05 PROCEDURE — 85025 COMPLETE CBC W/AUTO DIFF WBC: CPT

## 2022-07-05 PROCEDURE — 82533 TOTAL CORTISOL: CPT

## 2022-07-05 PROCEDURE — 4004F PT TOBACCO SCREEN RCVD TLK: CPT | Performed by: INTERNAL MEDICINE

## 2022-07-05 PROCEDURE — 2580000003 HC RX 258: Performed by: INTERNAL MEDICINE

## 2022-07-05 PROCEDURE — 1123F ACP DISCUSS/DSCN MKR DOCD: CPT | Performed by: INTERNAL MEDICINE

## 2022-07-05 PROCEDURE — 83735 ASSAY OF MAGNESIUM: CPT

## 2022-07-05 PROCEDURE — G8428 CUR MEDS NOT DOCUMENT: HCPCS | Performed by: INTERNAL MEDICINE

## 2022-07-05 RX ORDER — SODIUM CHLORIDE 9 MG/ML
5-250 INJECTION, SOLUTION INTRAVENOUS PRN
Status: CANCELLED | OUTPATIENT
Start: 2022-07-05

## 2022-07-05 RX ORDER — SODIUM CHLORIDE 9 MG/ML
INJECTION, SOLUTION INTRAVENOUS CONTINUOUS
Status: CANCELLED | OUTPATIENT
Start: 2022-07-05

## 2022-07-05 RX ORDER — ONDANSETRON 2 MG/ML
8 INJECTION INTRAMUSCULAR; INTRAVENOUS
Status: CANCELLED | OUTPATIENT
Start: 2022-07-05

## 2022-07-05 RX ORDER — ACETAMINOPHEN 325 MG/1
650 TABLET ORAL
Status: CANCELLED | OUTPATIENT
Start: 2022-07-05

## 2022-07-05 RX ORDER — SODIUM CHLORIDE 9 MG/ML
5-40 INJECTION INTRAVENOUS PRN
Status: CANCELLED | OUTPATIENT
Start: 2022-07-05

## 2022-07-05 RX ORDER — SODIUM CHLORIDE 0.9 % (FLUSH) 0.9 %
5-40 SYRINGE (ML) INJECTION PRN
Status: CANCELLED | OUTPATIENT
Start: 2022-07-05

## 2022-07-05 RX ORDER — SODIUM CHLORIDE 9 MG/ML
5-250 INJECTION, SOLUTION INTRAVENOUS PRN
Status: DISCONTINUED | OUTPATIENT
Start: 2022-07-05 | End: 2022-07-06 | Stop reason: HOSPADM

## 2022-07-05 RX ORDER — DIPHENHYDRAMINE HYDROCHLORIDE 50 MG/ML
50 INJECTION INTRAMUSCULAR; INTRAVENOUS
Status: CANCELLED | OUTPATIENT
Start: 2022-07-05

## 2022-07-05 RX ORDER — HEPARIN SODIUM (PORCINE) LOCK FLUSH IV SOLN 100 UNIT/ML 100 UNIT/ML
500 SOLUTION INTRAVENOUS PRN
Status: CANCELLED | OUTPATIENT
Start: 2022-07-05

## 2022-07-05 RX ORDER — SODIUM CHLORIDE 0.9 % (FLUSH) 0.9 %
5-40 SYRINGE (ML) INJECTION PRN
Status: DISCONTINUED | OUTPATIENT
Start: 2022-07-05 | End: 2022-07-06 | Stop reason: HOSPADM

## 2022-07-05 RX ORDER — ALBUTEROL SULFATE 90 UG/1
4 AEROSOL, METERED RESPIRATORY (INHALATION) PRN
Status: CANCELLED | OUTPATIENT
Start: 2022-07-05

## 2022-07-05 RX ORDER — EPINEPHRINE 1 MG/ML
0.3 INJECTION, SOLUTION, CONCENTRATE INTRAVENOUS PRN
Status: CANCELLED | OUTPATIENT
Start: 2022-07-05

## 2022-07-05 RX ORDER — FAMOTIDINE 10 MG/ML
20 INJECTION, SOLUTION INTRAVENOUS
Status: CANCELLED | OUTPATIENT
Start: 2022-07-05

## 2022-07-05 RX ORDER — MEPERIDINE HYDROCHLORIDE 50 MG/ML
12.5 INJECTION INTRAMUSCULAR; INTRAVENOUS; SUBCUTANEOUS PRN
Status: CANCELLED | OUTPATIENT
Start: 2022-07-05

## 2022-07-05 RX ADMIN — SODIUM CHLORIDE, PRESERVATIVE FREE 10 ML: 5 INJECTION INTRAVENOUS at 17:34

## 2022-07-05 RX ADMIN — SODIUM CHLORIDE 480 MG: 9 INJECTION, SOLUTION INTRAVENOUS at 16:55

## 2022-07-05 RX ADMIN — SODIUM CHLORIDE 50 ML/HR: 900 INJECTION, SOLUTION INTRAVENOUS at 16:34

## 2022-07-05 ASSESSMENT — PATIENT HEALTH QUESTIONNAIRE - PHQ9
1. LITTLE INTEREST OR PLEASURE IN DOING THINGS: 0
SUM OF ALL RESPONSES TO PHQ9 QUESTIONS 1 & 2: 0
5. POOR APPETITE OR OVEREATING: 0
SUM OF ALL RESPONSES TO PHQ QUESTIONS 1-9: 0
10. IF YOU CHECKED OFF ANY PROBLEMS, HOW DIFFICULT HAVE THESE PROBLEMS MADE IT FOR YOU TO DO YOUR WORK, TAKE CARE OF THINGS AT HOME, OR GET ALONG WITH OTHER PEOPLE: 0
6. FEELING BAD ABOUT YOURSELF - OR THAT YOU ARE A FAILURE OR HAVE LET YOURSELF OR YOUR FAMILY DOWN: 0
4. FEELING TIRED OR HAVING LITTLE ENERGY: 0
9. THOUGHTS THAT YOU WOULD BE BETTER OFF DEAD, OR OF HURTING YOURSELF: 0
SUM OF ALL RESPONSES TO PHQ QUESTIONS 1-9: 0
2. FEELING DOWN, DEPRESSED OR HOPELESS: 0
7. TROUBLE CONCENTRATING ON THINGS, SUCH AS READING THE NEWSPAPER OR WATCHING TELEVISION: 0
8. MOVING OR SPEAKING SO SLOWLY THAT OTHER PEOPLE COULD HAVE NOTICED. OR THE OPPOSITE, BEING SO FIGETY OR RESTLESS THAT YOU HAVE BEEN MOVING AROUND A LOT MORE THAN USUAL: 0
3. TROUBLE FALLING OR STAYING ASLEEP: 0

## 2022-07-05 NOTE — PATIENT INSTRUCTIONS
Patient Instructions from Today's Visit    Reason for Visit:  Follow up-Melanoma    Diagnosis Information:  [email or web address]      Plan: We will proceed with infusion. At about 6 months we will get a scan. If you have problems that aren't tolerable we can talk about it.     Follow Up:  NP in 4 weeks  Dr Be Agarwal in 8 weeks    Recent Lab Results:  Hospital Outpatient Visit on 07/05/2022   Component Date Value Ref Range Status    WBC 07/05/2022 5.0  4.3 - 11.1 K/uL Final    RBC 07/05/2022 3.72* 4.23 - 5.6 M/uL Final    Hemoglobin 07/05/2022 11.3* 13.6 - 17.2 g/dL Final    Hematocrit 07/05/2022 34.4  % Final    MCV 07/05/2022 92.5  79.6 - 97.8 FL Final    MCH 07/05/2022 30.4  26.1 - 32.9 PG Final    MCHC 07/05/2022 32.8  31.4 - 35.0 g/dL Final    RDW 07/05/2022 14.0  11.9 - 14.6 % Final    Platelets 18/47/5045 181  150 - 450 K/uL Final    MPV 07/05/2022 9.2* 9.4 - 12.3 FL Final    nRBC 07/05/2022 0.00  0.0 - 0.2 K/uL Final    **Note: Absolute NRBC parameter is now reported with Hemogram**    Differential Type 07/05/2022 AUTOMATED    Final    Seg Neutrophils 07/05/2022 67  43 - 78 % Final    Lymphocytes 07/05/2022 23  13 - 44 % Final    Monocytes 07/05/2022 5  4.0 - 12.0 % Final    Eosinophils % 07/05/2022 3  0.5 - 7.8 % Final    Basophils 07/05/2022 1  0.0 - 2.0 % Final    Immature Granulocytes 07/05/2022 0  0.0 - 5.0 % Final    Segs Absolute 07/05/2022 3.4  1.7 - 8.2 K/UL Final    Absolute Lymph # 07/05/2022 1.2  0.5 - 4.6 K/UL Final    Absolute Mono # 07/05/2022 0.3  0.1 - 1.3 K/UL Final    Absolute Eos # 07/05/2022 0.1  0.0 - 0.8 K/UL Final    Basophils Absolute 07/05/2022 0.1  0.0 - 0.2 K/UL Final    Absolute Immature Granulocyte 07/05/2022 0.0  0.0 - 0.5 K/UL Final    Sodium 07/05/2022 142  136 - 145 mmol/L Final    Potassium 07/05/2022 3.8  3.5 - 5.1 mmol/L Final    Chloride 07/05/2022 110* 98 - 107 mmol/L Final    CO2 07/05/2022 28  21 - 32 mmol/L Final    Anion Gap 07/05/2022 4* 7 - 16 mmol/L Final    Glucose 07/05/2022 131* 65 - 100 mg/dL Final    BUN 07/05/2022 26* 8 - 23 MG/DL Final    CREATININE 07/05/2022 2.00* 0.8 - 1.5 MG/DL Final    GFR  07/05/2022 42* >60 ml/min/1.73m2 Final    GFR Non- 07/05/2022 35* >60 ml/min/1.73m2 Final    Comment:      Estimated GFR is calculated using the Modification of Diet in Renal Disease (MDRD) Study equation, reported for both  Americans (GFRAA) and non- Americans (GFRNA), and normalized to 1.73m2 body surface area. The physician must decide which value applies to the patient. The MDRD study equation should only be used in individuals age 25 or older. It has not been validated for the following: pregnant women, patients with serious comorbid conditions,or on certain medications, or persons with extremes of body size, muscle mass, or nutritional status.       Calcium 07/05/2022 8.6  8.3 - 10.4 MG/DL Final    Total Bilirubin 07/05/2022 0.6  0.2 - 1.1 MG/DL Final    ALT 07/05/2022 23  12 - 65 U/L Final    AST 07/05/2022 18  15 - 37 U/L Final    Alk Phosphatase 07/05/2022 122  50 - 136 U/L Final    Total Protein 07/05/2022 6.2* 6.3 - 8.2 g/dL Final    Albumin 07/05/2022 2.9* 3.2 - 4.6 g/dL Final    Globulin 07/05/2022 3.3  2.3 - 3.5 g/dL Final    Albumin/Globulin Ratio 07/05/2022 0.9* 1.2 - 3.5   Final    Magnesium 07/05/2022 1.9  1.8 - 2.4 mg/dL Final    TSH, 3RD GENERATION 07/05/2022 1.810  0.358 - 3.740 uIU/mL Final    Uric Acid 07/05/2022 6.7* 2.6 - 6.0 MG/DL Final         Treatment Summary has been discussed and given to patient: n/a        -------------------------------------------------------------------------------------------------------------------  Please call our office at [phone] if you have any  of the following symptoms:   · Fever of 100.5 or greater  · Chills  · Shortness of breath  · Swelling or pain in one leg    After office hours an answering service is available and will contact a provider for emergencies or if you are experiencing any of the above symptoms.  Patient does express an interest in My Chart. My Chart log in information explained on the after visit summary printout at the [facility] [address] desk.     Nurys Scanlon MA

## 2022-07-05 NOTE — PROGRESS NOTES
HEMATOLOGY/ONCOLOGY FOLLOWUP  VISIT      Patient Name: Linnea Arteaga             Date of Visit: 2022  : 1945  Age:77 y.o. Presenting Complaint:  Linnea Arteaga  is seen in follow-up for melanoma    History of Present Illness:   Mr. Angel Meier was seen for the first time in our office in 2022. He had a rather extensive medical history over the prior 2 years unrelated to the melanoma for which he was referred. Apparently  beginning in 2016, he had a history of a cavitary mass in his left lung. This was evaluated extensively for the possibility of a malignancy and proved negative. Ultimately after a series of negative biopsies, and was treated with long-term  antibiotics for presumptive abscess and pneumonic inflammation. The pulmonary lesions were monitored over time and gradually improved to a point where he was lost to follow-up here in [place] but ultimately followed by a pulmonologist in [facility]. He was ultimately referred to our office based on the evaluation of a lesion on the posterior right neck and suprascapular area. The patient indicated that he had a skin tumor which grew rapidly to the size of the thumb over a period of 2 months. He  saw Dr. Sherron Baldwin who performed a biopsy of the lesion which proved to be an invasive malignant melanoma. The Breslow depth was measured as at least 9.3 mm and Ta level IV with ulceration there were 18 mitoses per square millimeter. There was  no lymphovascular invasion and no sign of regression. He was then referred to Dr. Sebastián Mendoza for wide excision. The pathology report revealed no residual melanoma identified and a lymphoscintigraphy demonstrated uptake in a lymph node in the low right neck/upper  chest.  A decision was made not to pursue this lymph node. The wide excision and lymph node scintigraphy was performed in 2021.   He was subsequently followed by Dr. Sebastián Mendoza and a PET scan was performed on February 28, 2022. This study showed  an enlarged right supraclavicular lymph node measuring 1.7 cm in short axis with an SUV of 6.8. There were no other enlarged nodes and no visceral findings of metastatic disease. He has lost approximately 30 pounds and has had a diminished appetite. The patient's tumor was  assessed and did have a BRAF mutation. Unfortunately however the mutation was not in the conventional V600E location. The tumor had no PD-L1 expression but did have a high tumor mutational burden. He underwent excision of a right supraclavicular lymph node. This node was the one visualized on PET scanning with hypermetabolic activity. 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 MRI of the brain showed no intracranial metastatic disease. He began treatment with adjuvant durvalumab in May 2022. He returns today for follow-up. Since last seen he has done reasonably well. He complains primarily of rather significant fatigue although he indicates this fatigue preexisted the initiation of nivolumab. Nevertheless, he is wondering whether the drug is contributing to his symptoms. He has no new pigmented lesions. There is no diarrhea. He has no headache and no focal neurologic complaints. There is no change in chronic cough. He has no fever, night sweats or other constitutional symptoms. Medications:   Current Outpatient Medications   Medication Sig Dispense Refill    albuterol sulfate  (90 Base) MCG/ACT inhaler Inhale 2 puffs into the lungs every 4 hours as needed      allopurinol (ZYLOPRIM) 100 MG tablet Take 100 mg by mouth daily      amLODIPine (NORVASC) 10 MG tablet TAKE ONE TABLET BY MOUTH ONCE DAILY.       budesonide-formoterol (SYMBICORT) 160-4.5 MCG/ACT AERO Inhale 2 puffs into the lungs 2 times daily      chlorthalidone (HYGROTON) 25 MG tablet TAKE 1 TABLET BY MOUTH DAILY FOR HIGH BLOOD PRESSURE      escitalopram (LEXAPRO) 20 MG tablet Take 20 mg by mouth daily      LORazepam (ATIVAN) 1 MG tablet Take 1 tab twice a day as needed for anxiety      magnesium oxide (MAG-OX) 400 MG tablet Take 400 mg by mouth daily      metoprolol succinate (TOPROL XL) 100 MG extended release tablet TAKE ONE TABLET BY MOUTH ONCE DAILY. Indications: ventricular rate control in atrial fibrillation      pravastatin (PRAVACHOL) 40 MG tablet TAKE 1 TABLET BY MOUTH EVERY NIGHT      tamsulosin (FLOMAX) 0.4 MG capsule Take 0.4 mg by mouth daily       No current facility-administered medications for this visit. Facility-Administered Medications Ordered in Other Visits   Medication Dose Route Frequency Provider Last Rate Last Admin    0.9 % sodium chloride infusion  5-250 mL/hr IntraVENous PRN Clark Thompson MD 50 mL/hr at 07/05/22 1634 50 mL/hr at 07/05/22 1634    nivolumab (OPDIVO) 480 mg in sodium chloride 0.9 % 100 mL chemo IVPB  480 mg IntraVENous Once Clark Thompson MD        sodium chloride flush 0.9 % injection 5-40 mL  5-40 mL IntraVENous PRN Clark Thompson MD           Allergies:  No Known Allergies    Review of Systems: The Review of Systems is documented in full in the internal medical record. All systems are negative other than for those noted above. Past Medical History:  Documented in electronic medical record    Past Surgical History:  Documented in electronic medical record    Social History:  Documented in electronic medical record    Family History:  Documented in electronic medical record      Physical Examination:  General Appearance: Healthy appearing patient in no acute distress.    Vital signs: /65 Comment: standing  Pulse 62   Temp 98 °F (36.7 °C) (Oral)   Resp 12   Ht 5' 7\" (1.702 m)   Wt 130 lb (59 kg)   SpO2 97%   BMI 20.36 kg/m²     Performance status: ECOG Level: 1  Distress  Screening Score: PHQ-9 Total Score: 0 (7/5/2022  2:44 PM)  Thoughts that you would be better off dead, or of hurting yourself in some way: 0 (7/5/2022  2:44 PM)  Pain Score:   0 - No pain (fatigue-2)    HEENT: No oral exam performed. Neck: Supple. There is no thyromegaly. Lymph nodes: There is no cervical, supraclavicular, axillary or inguinal adenopathy. Lungs: The lungs are clear to auscultation and percussion. There is no egophony. There is no chest wall tenderness and no  use of accessory respiratory musculature. Heart: There is no jugular venous distention. The rate is normal and rhythm regular. The S1 and S2 are normal and there are no murmurs or rubs. Abdomen: Soft, non-tender, bowel sounds present and normal, no appreciated hepatosplenomegaly. No palpable masses. Skin: No rash, petechiae or ecchymoses. No evidence of malignancy. Extremities: No cyanosis, clubbing or edema. Labs/Imaging:  Lab Results   Component Value Date/Time    WBC 5.0 07/05/2022 02:22 PM    HGB 11.3 07/05/2022 02:22 PM    HCT 34.4 07/05/2022 02:22 PM     07/05/2022 02:22 PM    MCV 92.5 07/05/2022 02:22 PM       Lab Results   Component Value Date/Time     07/05/2022 02:22 PM    K 3.8 07/05/2022 02:22 PM     07/05/2022 02:22 PM    CO2 28 07/05/2022 02:22 PM    BUN 26 07/05/2022 02:22 PM    GFRAA 42 07/05/2022 02:22 PM    GLOB 3.3 07/05/2022 02:22 PM    ALT 23 07/05/2022 02:22 PM       Above results reviewed with patient. ASSESSMENT:   This gentleman had a rather substantial melanoma resected from his right upper back. The lymphoscintigraphy suggested a sentinel node in the right supraclavicular region. This was visualized on the  PET scan as well. He underwent an excision of this lymph node which was positive for metastatic melanoma. He does have a BRAF mutation but it is not in the typical V600E region and less likely to respond to targeted therapy. Given the above we ultimately agreed to proceed with immunotherapy. He began this in May 2022.   Fatigue-the patient has concerns that nivolumab is contributing to this although he states it all started prior to the beginning of his immunotherapy. This raises the question of whether it has something to do with an underlying medical issue or other medications he is taking. PLAN:  Proceed with cycle 3 of nivolumab. He will discuss his medications with his primary care physician. In the meantime, we will check thyroid and cortisol studies.         RESUSCITATION DIRECTIVES/HOSPICE CARE;  Full Support           [facility]    Oncology and Hematology   [place]  [address] [place] [address]  P [phone]  F [phone]  [email or web address]

## 2022-07-05 NOTE — PROGRESS NOTES
Pt arrived ambulatory. OPdivo completed without complications. Pt aware of next appt 8/2 at 1530. Discharged ambulatory, no distress noted.   Patient instructed to call provider with temperature of 100.4 or greater or nausea/vomiting/ diarrhea or pain not controlled by medications

## 2022-07-18 ENCOUNTER — TELEPHONE (OUTPATIENT)
Dept: INTERNAL MEDICINE CLINIC | Facility: CLINIC | Age: 77
End: 2022-07-18

## 2022-07-21 ENCOUNTER — OFFICE VISIT (OUTPATIENT)
Dept: INTERNAL MEDICINE CLINIC | Facility: CLINIC | Age: 77
End: 2022-07-21
Payer: MEDICARE

## 2022-07-21 VITALS
SYSTOLIC BLOOD PRESSURE: 148 MMHG | TEMPERATURE: 98.6 F | BODY MASS INDEX: 20.65 KG/M2 | WEIGHT: 131.6 LBS | RESPIRATION RATE: 16 BRPM | HEART RATE: 68 BPM | HEIGHT: 67 IN | OXYGEN SATURATION: 98 % | DIASTOLIC BLOOD PRESSURE: 67 MMHG

## 2022-07-21 DIAGNOSIS — I10 PRIMARY HYPERTENSION: ICD-10-CM

## 2022-07-21 DIAGNOSIS — Z85.820 HISTORY OF MALIGNANT MELANOMA: ICD-10-CM

## 2022-07-21 DIAGNOSIS — Z09 HOSPITAL DISCHARGE FOLLOW-UP: ICD-10-CM

## 2022-07-21 DIAGNOSIS — J41.0 SIMPLE CHRONIC BRONCHITIS (HCC): ICD-10-CM

## 2022-07-21 DIAGNOSIS — Z86.39: Primary | ICD-10-CM

## 2022-07-21 DIAGNOSIS — R11.0 NAUSEA: ICD-10-CM

## 2022-07-21 LAB
BASOPHILS # BLD: 0.1 K/UL (ref 0–0.2)
BASOPHILS NFR BLD: 1 % (ref 0–2)
DIFFERENTIAL METHOD BLD: ABNORMAL
EOSINOPHIL # BLD: 0.1 K/UL (ref 0–0.8)
EOSINOPHIL NFR BLD: 1 % (ref 0.5–7.8)
ERYTHROCYTE [DISTWIDTH] IN BLOOD BY AUTOMATED COUNT: 14.2 % (ref 11.9–14.6)
HCT VFR BLD AUTO: 37.2 % (ref 41.1–50.3)
HGB BLD-MCNC: 12.1 G/DL (ref 13.6–17.2)
IMM GRANULOCYTES # BLD AUTO: 0 K/UL (ref 0–0.5)
IMM GRANULOCYTES NFR BLD AUTO: 0 % (ref 0–5)
LYMPHOCYTES # BLD: 1 K/UL (ref 0.5–4.6)
LYMPHOCYTES NFR BLD: 21 % (ref 13–44)
MCH RBC QN AUTO: 30.9 PG (ref 26.1–32.9)
MCHC RBC AUTO-ENTMCNC: 32.5 G/DL (ref 31.4–35)
MCV RBC AUTO: 94.9 FL (ref 79.6–97.8)
MONOCYTES # BLD: 0.2 K/UL (ref 0.1–1.3)
MONOCYTES NFR BLD: 5 % (ref 4–12)
NEUTS SEG # BLD: 3.5 K/UL (ref 1.7–8.2)
NEUTS SEG NFR BLD: 72 % (ref 43–78)
NRBC # BLD: 0 K/UL (ref 0–0.2)
PLATELET # BLD AUTO: 206 K/UL (ref 150–450)
PMV BLD AUTO: 10 FL (ref 9.4–12.3)
RBC # BLD AUTO: 3.92 M/UL (ref 4.23–5.6)
WBC # BLD AUTO: 4.9 K/UL (ref 4.3–11.1)

## 2022-07-21 PROCEDURE — 99495 TRANSJ CARE MGMT MOD F2F 14D: CPT | Performed by: NURSE PRACTITIONER

## 2022-07-21 PROCEDURE — 1111F DSCHRG MED/CURRENT MED MERGE: CPT | Performed by: NURSE PRACTITIONER

## 2022-07-21 RX ORDER — ONDANSETRON 4 MG/1
4 TABLET, ORALLY DISINTEGRATING ORAL 3 TIMES DAILY PRN
Qty: 21 TABLET | Refills: 0 | Status: SHIPPED | OUTPATIENT
Start: 2022-07-21 | End: 2022-08-25

## 2022-07-21 ASSESSMENT — ENCOUNTER SYMPTOMS
DIARRHEA: 0
VOMITING: 0
NAUSEA: 1
CONSTIPATION: 0
BLOOD IN STOOL: 0
ABDOMINAL PAIN: 0

## 2022-07-21 NOTE — PATIENT INSTRUCTIONS
Monitor blood pressure three times weekly and on those days check twice daily and record. Contact the office for readings greater than 140/90 consistently, or for any new or worsening sx.

## 2022-07-21 NOTE — PROGRESS NOTES
7/21/2022 3:14 PM  Location:[facility] [address] [facility]  [place]  Patient #:  [id number]  YOB: 1945          YOUR LAST HEMOGLOBIN A1CS:   No results found for: HBA1C, [id number]    YOUR LAST LIPID PROFILE:   Lab Results   Component Value Date/Time    CHOL 119 04/27/2021 08:13 AM    HDL 34 04/27/2021 08:13 AM    VLDL 18 04/27/2021 08:13 AM         Lab Results   Component Value Date/Time    GFRAA 42 07/05/2022 02:22 PM    BUN 26 07/05/2022 02:22 PM     07/05/2022 02:22 PM    K 3.8 07/05/2022 02:22 PM     07/05/2022 02:22 PM    CO2 28 07/05/2022 02:22 PM           History of Present Illness     Chief Complaint   Patient presents with    Follow-Up from [facility] Follow-up- Discharged 7/17/2022    Nausea     Still Having nausea; throw up sometimes       Mr. Jordan Tamayo is a 69 y.o. male  who presents for hospital follow up. Mr. Jordan Tamayo presents for hospital follow-up. He was admitted from July 13 through the 17th. He presented with acute encephalopathy secondary to hypovolemia. His MRI showed no acute injury. He responded well to IV fluids and his diuretics were held prior to discharge. His history is significant for CKD, hypertension, leg melanoma of his back and COPD. He is currently undergoing active immunotherapy and is followed by Dr. Be Agarwal and Dr. Chichi Mak for his malignant melanoma. He had hypokalemia of 3.0 during his hospitalization which was corrected. His hemoglobin was 11.3 and creatinine 1.37 prior to discharge. He underwent carotid studies which showed a right ICA of 40 to 59% and left ICA of 0 to 39%. An echo showed an EF of 55%. He had a mild elevation in troponin during his hospital stay which was thought to be elevated from his CKD. X-ray showed no acute process. He was discharged home with home health but they declined as his wife feels that she can manage him at home.   His chlorthalidone was stopped prior to discharge. His wife of 10 years is with him today and reports that she is unsure what medicines he is currently taking but thinks she has stopped the chlorthalidone. They have not been checking his blood pressures at home. He states that he is eating and drinking well and denies any fevers or chills, chest pain, shortness of breath or leg swelling. He denies any further confusion episodes. He does report chronic nausea but no vomiting. No Known Allergies  Past Medical History:   Diagnosis Date    Adverse effect of anesthesia     Had problems urinating after last general anesthesia     Arthritis     Chronic obstructive pulmonary disease (HCC)     daily and prn inhaler, Followed by  [facility] [place]     Depression     anxiety, treated with med    Ear problems     Erectile disorder due to medical condition in male patient     GERD (gastroesophageal reflux disease)     managed with meds     Gout     Hearing reduced     has hearing aids, but wears them infrequently    Heart murmur     Echo 4/2021- moderate aortic stenosis, EF 50-55%    History of colon polyps     History of kidney stones     1-2 episodes years ago. [place] (hard of hearing)     Hyperlipidemia     on no med    Hypertension     Managed with meds     Melanoma ([facility] [address]     Moderate aortic stenosis by prior echocardiogram 04/2021    Peripheral vascular disease ([facility] [address]     Psoriasis     Transient ischemic attack     patient denies 6/26/20     Social History     Socioeconomic History    Marital status:      Spouse name: None    Number of children: None    Years of education: None    Highest education level: None   Tobacco Use    Smoking status: Some Days     Packs/day: 0.25     Types: Cigarettes    Smokeless tobacco: Never    Tobacco comments:     Quit smoking: currently smoking 2-3 cigarettes a day   Substance and Sexual Activity    Alcohol use: No    Drug use: No   Social History Narrative     and lives with wife. No pets.      Social Determinants of Health     Financial Resource Strain: Low Risk     Difficulty of Paying Living Expenses: Not hard at all   Food Insecurity: No Food Insecurity    Worried About [address] in the Last Year: Never true    [facility] in the Last Year: Never true     Past Surgical History:   Procedure Laterality Date    ANTERIOR CRUCIATE LIGAMENT REPAIR      BACK SURGERY      COLONOSCOPY N/A 7/6/2020    COLONOSCOPY/BMI 23 performed by Prashanth Hernandez MD at [facility] ENDOSCOPY    HEENT Right 05/06/2022    excisional bx of R supra-clavicular neck mass- Marlea Buzzard    LITHOTRIPSY      MALIGNANT SKIN LESION EXCISION       Current Outpatient Medications   Medication Sig Dispense Refill    albuterol sulfate  (90 Base) MCG/ACT inhaler Inhale 2 puffs into the lungs every 4 hours as needed      allopurinol (ZYLOPRIM) 100 MG tablet Take 100 mg by mouth daily      amLODIPine (NORVASC) 10 MG tablet TAKE ONE TABLET BY MOUTH ONCE DAILY. budesonide-formoterol (SYMBICORT) 160-4.5 MCG/ACT AERO Inhale 2 puffs into the lungs 2 times daily      chlorthalidone (HYGROTON) 25 MG tablet TAKE 1 TABLET BY MOUTH DAILY FOR HIGH BLOOD PRESSURE      escitalopram (LEXAPRO) 20 MG tablet Take 20 mg by mouth daily      LORazepam (ATIVAN) 1 MG tablet Take 1 tab twice a day as needed for anxiety      magnesium oxide (MAG-OX) 400 MG tablet Take 400 mg by mouth daily      metoprolol succinate (TOPROL XL) 100 MG extended release tablet TAKE ONE TABLET BY MOUTH ONCE DAILY. Indications: ventricular rate control in atrial fibrillation      pravastatin (PRAVACHOL) 40 MG tablet TAKE 1 TABLET BY MOUTH EVERY NIGHT      tamsulosin (FLOMAX) 0.4 MG capsule Take 0.4 mg by mouth daily       No current facility-administered medications for this visit.      Health Maintenance   Topic Date Due    DTaP/Tdap/Td vaccine (1 - Tdap) Never done    Shingles vaccine (1 of 2) Never done    COVID-19 Vaccine (4 - Booster for Moderna series) 02/05/2022    Flu vaccine oriented to person, place, and time. Assessment & Plan         Current Outpatient Medications   Medication Sig Dispense Refill    albuterol sulfate  (90 Base) MCG/ACT inhaler Inhale 2 puffs into the lungs every 4 hours as needed      allopurinol (ZYLOPRIM) 100 MG tablet Take 100 mg by mouth daily      amLODIPine (NORVASC) 10 MG tablet TAKE ONE TABLET BY MOUTH ONCE DAILY. budesonide-formoterol (SYMBICORT) 160-4.5 MCG/ACT AERO Inhale 2 puffs into the lungs 2 times daily      chlorthalidone (HYGROTON) 25 MG tablet TAKE 1 TABLET BY MOUTH DAILY FOR HIGH BLOOD PRESSURE      escitalopram (LEXAPRO) 20 MG tablet Take 20 mg by mouth daily      LORazepam (ATIVAN) 1 MG tablet Take 1 tab twice a day as needed for anxiety      magnesium oxide (MAG-OX) 400 MG tablet Take 400 mg by mouth daily      metoprolol succinate (TOPROL XL) 100 MG extended release tablet TAKE ONE TABLET BY MOUTH ONCE DAILY. Indications: ventricular rate control in atrial fibrillation      pravastatin (PRAVACHOL) 40 MG tablet TAKE 1 TABLET BY MOUTH EVERY NIGHT      tamsulosin (FLOMAX) 0.4 MG capsule Take 0.4 mg by mouth daily       No current facility-administered medications for this visit. 1. History of hypovolemia  With recent ALEXANDER and acute encephalopathy, corrected with IV fluids, negative MRI of his brain, diuretics held prior to hospital discharge. He does have debility and generalized weakness, they declined home health. Wife is not sure what medication she is taking. I went over them in detail with her today and she knows to hold the chlorthalidone while monitoring his blood pressure. They will contact the office for readings greater than 140/80. Has upcoming follow-up with Dr. Bailey Pérez. 2. History of malignant melanoma  Followed by oncology, active immunotherapy treatment. 3. Nausea  With history of ALEXANDER as well as hypokalemia. We will recheck labs and follow-up by phone. He reports he is eating and drinking well.   - Comprehensive Metabolic Panel; Future  - CBC with Auto Differential; Future  - CBC with Auto Differential  - Comprehensive Metabolic Panel    4. Simple chronic bronchitis (HCC)  Stable    5. Primary hypertension  Discussed with his wife that he should stop the chlorthalidone and monitor blood pressures. She is instructed to call the office for readings greater than 140/90. For now continue the amlodipine 10 mg and metoprolol, consider adding an ACE or ARB if needed. Avoid diuretics.         Gustavo Garnica NP, APRN - CNP

## 2022-07-22 ENCOUNTER — TELEPHONE (OUTPATIENT)
Dept: INTERNAL MEDICINE CLINIC | Facility: CLINIC | Age: 77
End: 2022-07-22

## 2022-07-22 DIAGNOSIS — R73.09 ELEVATED GLUCOSE: Primary | ICD-10-CM

## 2022-07-22 DIAGNOSIS — R73.09 ELEVATED GLUCOSE: ICD-10-CM

## 2022-07-22 LAB
ALBUMIN SERPL-MCNC: 3.1 G/DL (ref 3.2–4.6)
ALBUMIN/GLOB SERPL: 1.1 {RATIO} (ref 1.2–3.5)
ALP SERPL-CCNC: 128 U/L (ref 50–136)
ALT SERPL-CCNC: 24 U/L (ref 12–65)
ANION GAP SERPL CALC-SCNC: 6 MMOL/L (ref 7–16)
AST SERPL-CCNC: 20 U/L (ref 15–37)
BILIRUB SERPL-MCNC: 0.7 MG/DL (ref 0.2–1.1)
BUN SERPL-MCNC: 18 MG/DL (ref 8–23)
CALCIUM SERPL-MCNC: 9 MG/DL (ref 8.3–10.4)
CHLORIDE SERPL-SCNC: 106 MMOL/L (ref 98–107)
CO2 SERPL-SCNC: 27 MMOL/L (ref 21–32)
CREAT SERPL-MCNC: 1.6 MG/DL (ref 0.8–1.5)
GLOBULIN SER CALC-MCNC: 2.9 G/DL (ref 2.3–3.5)
GLUCOSE SERPL-MCNC: 209 MG/DL (ref 65–100)
POTASSIUM SERPL-SCNC: 4 MMOL/L (ref 3.5–5.1)
PROT SERPL-MCNC: 6 G/DL (ref 6.3–8.2)
SODIUM SERPL-SCNC: 139 MMOL/L (ref 136–145)

## 2022-07-22 NOTE — TELEPHONE ENCOUNTER
Mr. Dajuan Pearce-  Other than your glucose being elevated over 200, your labs have improved with improved creatinine and normal potassium. We are going to add on a hgba1c to make sure you are not developing diabetes. Continue to stay hydrated and watch your blood pressure since we stopped the Chlorthalidone. Please let us know if you develop  any new or worsening sx. [place]    Can we add a1c on to labs? If not, have him come in for re-draw. Thanks!

## 2022-07-25 ENCOUNTER — TELEPHONE (OUTPATIENT)
Dept: INTERNAL MEDICINE CLINIC | Facility: CLINIC | Age: 77
End: 2022-07-25

## 2022-07-25 LAB
EST. AVERAGE GLUCOSE BLD GHB EST-MCNC: 103 MG/DL
HBA1C MFR BLD: 5.2 % (ref 4.8–5.6)

## 2022-08-02 ENCOUNTER — HOSPITAL ENCOUNTER (OUTPATIENT)
Dept: LAB | Age: 77
Discharge: HOME | End: 2022-08-05
Payer: MEDICARE

## 2022-08-02 ENCOUNTER — HOSPITAL ENCOUNTER (OUTPATIENT)
Dept: INFUSION THERAPY | Age: 77
Discharge: HOME | End: 2022-08-02
Payer: MEDICARE

## 2022-08-02 ENCOUNTER — OFFICE VISIT (OUTPATIENT)
Dept: ONCOLOGY | Age: 77
End: 2022-08-02
Payer: MEDICARE

## 2022-08-02 VITALS
RESPIRATION RATE: 16 BRPM | DIASTOLIC BLOOD PRESSURE: 71 MMHG | OXYGEN SATURATION: 99 % | BODY MASS INDEX: 21.46 KG/M2 | HEIGHT: 67 IN | WEIGHT: 136.7 LBS | SYSTOLIC BLOOD PRESSURE: 146 MMHG | HEART RATE: 53 BPM | TEMPERATURE: 98.3 F

## 2022-08-02 DIAGNOSIS — C43.59 MELANOMA OF BACK (HCC): ICD-10-CM

## 2022-08-02 DIAGNOSIS — C43.9 METASTATIC MELANOMA (HCC): ICD-10-CM

## 2022-08-02 DIAGNOSIS — C43.59 MELANOMA OF BACK (HCC): Primary | ICD-10-CM

## 2022-08-02 DIAGNOSIS — R11.0 CHEMOTHERAPY-INDUCED NAUSEA: ICD-10-CM

## 2022-08-02 DIAGNOSIS — R68.89 OTHER GENERAL SYMPTOMS AND SIGNS: ICD-10-CM

## 2022-08-02 DIAGNOSIS — T45.1X5A CHEMOTHERAPY-INDUCED NAUSEA: ICD-10-CM

## 2022-08-02 LAB
ALBUMIN SERPL-MCNC: 2.6 G/DL (ref 3.2–4.6)
ALBUMIN/GLOB SERPL: 0.8 {RATIO} (ref 1.2–3.5)
ALP SERPL-CCNC: 123 U/L (ref 50–136)
ALT SERPL-CCNC: 16 U/L (ref 12–65)
ANION GAP SERPL CALC-SCNC: 7 MMOL/L (ref 7–16)
AST SERPL-CCNC: 13 U/L (ref 15–37)
BASOPHILS # BLD: 0.1 K/UL (ref 0–0.2)
BASOPHILS NFR BLD: 1 % (ref 0–2)
BILIRUB SERPL-MCNC: 0.9 MG/DL (ref 0.2–1.1)
BUN SERPL-MCNC: 16 MG/DL (ref 8–23)
CALCIUM SERPL-MCNC: 8.5 MG/DL (ref 8.3–10.4)
CHLORIDE SERPL-SCNC: 108 MMOL/L (ref 98–107)
CO2 SERPL-SCNC: 26 MMOL/L (ref 21–32)
CREAT SERPL-MCNC: 1.8 MG/DL (ref 0.8–1.5)
DIFFERENTIAL METHOD BLD: ABNORMAL
EOSINOPHIL # BLD: 0.2 K/UL (ref 0–0.8)
EOSINOPHIL NFR BLD: 4 % (ref 0.5–7.8)
ERYTHROCYTE [DISTWIDTH] IN BLOOD BY AUTOMATED COUNT: 14 % (ref 11.9–14.6)
GLOBULIN SER CALC-MCNC: 3.2 G/DL (ref 2.3–3.5)
GLUCOSE SERPL-MCNC: 182 MG/DL (ref 65–100)
HCT VFR BLD AUTO: 35 %
HGB BLD-MCNC: 11.7 G/DL (ref 13.6–17.2)
IMM GRANULOCYTES # BLD AUTO: 0 K/UL (ref 0–0.5)
IMM GRANULOCYTES NFR BLD AUTO: 0 % (ref 0–5)
LYMPHOCYTES # BLD: 1.1 K/UL (ref 0.5–4.6)
LYMPHOCYTES NFR BLD: 22 % (ref 13–44)
MAGNESIUM SERPL-MCNC: 2.1 MG/DL (ref 1.8–2.4)
MCH RBC QN AUTO: 30.4 PG (ref 26.1–32.9)
MCHC RBC AUTO-ENTMCNC: 33.4 G/DL (ref 31.4–35)
MCV RBC AUTO: 90.9 FL (ref 79.6–97.8)
MONOCYTES # BLD: 0.2 K/UL (ref 0.1–1.3)
MONOCYTES NFR BLD: 4 % (ref 4–12)
NEUTS SEG # BLD: 3.5 K/UL (ref 1.7–8.2)
NEUTS SEG NFR BLD: 69 % (ref 43–78)
NRBC # BLD: 0 K/UL (ref 0–0.2)
PLATELET # BLD AUTO: 171 K/UL (ref 150–450)
PMV BLD AUTO: 9.4 FL (ref 9.4–12.3)
POTASSIUM SERPL-SCNC: 3.8 MMOL/L (ref 3.5–5.1)
PROT SERPL-MCNC: 5.8 G/DL (ref 6.3–8.2)
RBC # BLD AUTO: 3.85 M/UL (ref 4.23–5.6)
SODIUM SERPL-SCNC: 141 MMOL/L (ref 136–145)
TSH, 3RD GENERATION: 2.31 UIU/ML (ref 0.36–3)
URATE SERPL-MCNC: 6.8 MG/DL (ref 2.6–6)
WBC # BLD AUTO: 5.1 K/UL (ref 4.3–11.1)

## 2022-08-02 PROCEDURE — 2580000003 HC RX 258: Performed by: NURSE PRACTITIONER

## 2022-08-02 PROCEDURE — 84443 ASSAY THYROID STIM HORMONE: CPT

## 2022-08-02 PROCEDURE — G8427 DOCREV CUR MEDS BY ELIG CLIN: HCPCS | Performed by: NURSE PRACTITIONER

## 2022-08-02 PROCEDURE — G8420 CALC BMI NORM PARAMETERS: HCPCS | Performed by: NURSE PRACTITIONER

## 2022-08-02 PROCEDURE — 96413 CHEMO IV INFUSION 1 HR: CPT

## 2022-08-02 PROCEDURE — 83735 ASSAY OF MAGNESIUM: CPT

## 2022-08-02 PROCEDURE — 6360000002 HC RX W HCPCS: Performed by: NURSE PRACTITIONER

## 2022-08-02 PROCEDURE — 36415 COLL VENOUS BLD VENIPUNCTURE: CPT

## 2022-08-02 PROCEDURE — 85025 COMPLETE CBC W/AUTO DIFF WBC: CPT

## 2022-08-02 PROCEDURE — 84550 ASSAY OF BLOOD/URIC ACID: CPT

## 2022-08-02 PROCEDURE — 80053 COMPREHEN METABOLIC PANEL: CPT

## 2022-08-02 PROCEDURE — 99214 OFFICE O/P EST MOD 30 MIN: CPT | Performed by: NURSE PRACTITIONER

## 2022-08-02 PROCEDURE — 4004F PT TOBACCO SCREEN RCVD TLK: CPT | Performed by: NURSE PRACTITIONER

## 2022-08-02 PROCEDURE — 1123F ACP DISCUSS/DSCN MKR DOCD: CPT | Performed by: NURSE PRACTITIONER

## 2022-08-02 RX ORDER — ONDANSETRON 2 MG/ML
8 INJECTION INTRAMUSCULAR; INTRAVENOUS
Status: CANCELLED | OUTPATIENT
Start: 2022-08-02

## 2022-08-02 RX ORDER — ACETAMINOPHEN 325 MG/1
650 TABLET ORAL
Status: CANCELLED | OUTPATIENT
Start: 2022-08-02

## 2022-08-02 RX ORDER — DIPHENHYDRAMINE HYDROCHLORIDE 50 MG/ML
50 INJECTION INTRAMUSCULAR; INTRAVENOUS
Status: CANCELLED | OUTPATIENT
Start: 2022-08-02

## 2022-08-02 RX ORDER — SODIUM CHLORIDE 9 MG/ML
5-250 INJECTION, SOLUTION INTRAVENOUS PRN
Status: CANCELLED | OUTPATIENT
Start: 2022-08-02

## 2022-08-02 RX ORDER — SODIUM CHLORIDE 9 MG/ML
5-250 INJECTION, SOLUTION INTRAVENOUS PRN
Status: DISCONTINUED | OUTPATIENT
Start: 2022-08-02 | End: 2022-08-03 | Stop reason: HOSPADM

## 2022-08-02 RX ORDER — SODIUM CHLORIDE 9 MG/ML
INJECTION, SOLUTION INTRAVENOUS CONTINUOUS
Status: CANCELLED | OUTPATIENT
Start: 2022-08-02

## 2022-08-02 RX ORDER — ONDANSETRON 4 MG/1
8 TABLET, FILM COATED ORAL EVERY 8 HOURS PRN
Qty: 30 TABLET | Refills: 1 | Status: SHIPPED | OUTPATIENT
Start: 2022-08-02

## 2022-08-02 RX ORDER — SODIUM CHLORIDE 0.9 % (FLUSH) 0.9 %
5-40 SYRINGE (ML) INJECTION PRN
Status: DISCONTINUED | OUTPATIENT
Start: 2022-08-02 | End: 2022-08-03 | Stop reason: HOSPADM

## 2022-08-02 RX ORDER — FAMOTIDINE 10 MG/ML
20 INJECTION, SOLUTION INTRAVENOUS
Status: CANCELLED | OUTPATIENT
Start: 2022-08-02

## 2022-08-02 RX ORDER — ALBUTEROL SULFATE 90 UG/1
4 AEROSOL, METERED RESPIRATORY (INHALATION) PRN
Status: CANCELLED | OUTPATIENT
Start: 2022-08-02

## 2022-08-02 RX ORDER — HEPARIN SODIUM (PORCINE) LOCK FLUSH IV SOLN 100 UNIT/ML 100 UNIT/ML
500 SOLUTION INTRAVENOUS PRN
Status: CANCELLED | OUTPATIENT
Start: 2022-08-02

## 2022-08-02 RX ORDER — MEPERIDINE HYDROCHLORIDE 50 MG/ML
12.5 INJECTION INTRAMUSCULAR; INTRAVENOUS; SUBCUTANEOUS PRN
Status: CANCELLED | OUTPATIENT
Start: 2022-08-02

## 2022-08-02 RX ORDER — EPINEPHRINE 1 MG/ML
0.3 INJECTION, SOLUTION, CONCENTRATE INTRAVENOUS PRN
Status: CANCELLED | OUTPATIENT
Start: 2022-08-02

## 2022-08-02 RX ORDER — SODIUM CHLORIDE 0.9 % (FLUSH) 0.9 %
5-40 SYRINGE (ML) INJECTION PRN
Status: CANCELLED | OUTPATIENT
Start: 2022-08-02

## 2022-08-02 RX ORDER — SODIUM CHLORIDE 9 MG/ML
5-40 INJECTION INTRAVENOUS PRN
Status: CANCELLED | OUTPATIENT
Start: 2022-08-02

## 2022-08-02 RX ADMIN — SODIUM CHLORIDE 480 MG: 9 INJECTION, SOLUTION INTRAVENOUS at 16:37

## 2022-08-02 RX ADMIN — SODIUM CHLORIDE 25 ML/HR: 9 INJECTION, SOLUTION INTRAVENOUS at 16:32

## 2022-08-02 RX ADMIN — SODIUM CHLORIDE, PRESERVATIVE FREE 10 ML: 5 INJECTION INTRAVENOUS at 16:31

## 2022-08-02 NOTE — PROGRESS NOTES
performed on February 28, 2022. This study showed  an enlarged right supraclavicular lymph node measuring 1.7 cm in short axis with an SUV of 6.8. There were no other enlarged nodes and no visceral findings of metastatic disease. He has lost approximately 30 pounds and has had a diminished appetite. The patient's tumor was  assessed and did have a BRAF mutation. Unfortunately however the mutation was not in the conventional V600E location. The tumor had no PD-L1 expression but did have a high tumor mutational burden. He underwent excision of a right supraclavicular lymph node. This node was the one visualized on PET scanning with hypermetabolic activity. 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 MRI of the brain showed no intracranial metastatic disease. He began treatment with adjuvant durvalumab in May 2022. He returns today for follow-up. He is tolerating therapy rather well. His biggest complaint is nausea. He states that it is constant. He has occasionally used 4mg Zofran with minimal relief. His wife reports the biggest alleviating factor is eating. He tends to eat 2 meals a day and feels that his PO intake his adequate. His weight is up since last seen. Otherwise, he is tolerating treatment without remark. No headache and no focal neurologic complaints. There is no change in chronic cough. He has no fever, night sweats or other constitutional symptoms. No pulmonary or GI symptoms.     Medications:   Current Outpatient Medications   Medication Sig Dispense Refill    ondansetron (ZOFRAN) 4 MG tablet Take 2 tablets by mouth every 8 hours as needed for Nausea or Vomiting 30 tablet 1    ondansetron (ZOFRAN-ODT) 4 MG disintegrating tablet Take 1 tablet by mouth 3 times daily as needed for Nausea or Vomiting 21 tablet 0    albuterol sulfate  (90 Base) MCG/ACT inhaler Inhale 2 puffs into the lungs every 4 hours as needed      allopurinol (ZYLOPRIM) 100 MG tablet Take 100 mg by mouth daily      amLODIPine (NORVASC) 10 MG tablet TAKE ONE TABLET BY MOUTH ONCE DAILY. budesonide-formoterol (SYMBICORT) 160-4.5 MCG/ACT AERO Inhale 2 puffs into the lungs 2 times daily      escitalopram (LEXAPRO) 20 MG tablet Take 20 mg by mouth daily      LORazepam (ATIVAN) 1 MG tablet Take 1 tab twice a day as needed for anxiety      magnesium oxide (MAG-OX) 400 MG tablet Take 400 mg by mouth daily      metoprolol succinate (TOPROL XL) 100 MG extended release tablet TAKE ONE TABLET BY MOUTH ONCE DAILY. Indications: ventricular rate control in atrial fibrillation      pravastatin (PRAVACHOL) 40 MG tablet TAKE 1 TABLET BY MOUTH EVERY NIGHT      tamsulosin (FLOMAX) 0.4 MG capsule Take 0.4 mg by mouth daily       No current facility-administered medications for this visit. Facility-Administered Medications Ordered in Other Visits   Medication Dose Route Frequency Provider Last Rate Last Admin    0.9 % sodium chloride infusion  5-250 mL/hr IntraVENous PRN Es Turner, APRN - CNP   Stopped at 08/02/22 1718    sodium chloride flush 0.9 % injection 5-40 mL  5-40 mL IntraVENous PRN Es Turner APRN - CNP   10 mL at 08/02/22 1631       Allergies:  No Known Allergies    Review of Systems: The Review of Systems is documented in full in the internal medical record. All systems are negative other than for those noted above. Past Medical History:  Documented in electronic medical record    Past Surgical History:  Documented in electronic medical record    Social History:  Documented in electronic medical record    Family History:  Documented in electronic medical record      Physical Examination:  General Appearance: Healthy appearing patient in no acute distress.    Vital signs: BP (!) 146/71   Pulse 53   Temp 98.3 °F (36.8 °C)   Resp 16   Ht 5' 7\" (1.702 m)   Wt 136 lb 11.2 oz (62 kg)   SpO2 99%   BMI 21.41 kg/m²     Performance status: ECOG Level: 1  Distress  Screening Score: No data recorded  Pain Score:   0 - No pain    HEENT: No oral exam performed. Neck: Supple. There is no thyromegaly. Lymph nodes: There is no cervical, supraclavicular, axillary or inguinal adenopathy. Lungs: The lungs are clear to auscultation and percussion. There is no egophony. There is no chest wall tenderness and no  use of accessory respiratory musculature. Heart: There is no jugular venous distention. The rate is normal and rhythm regular. The S1 and S2 are normal and there are no murmurs or rubs. Abdomen: Soft, non-tender, bowel sounds present and normal, no appreciated hepatosplenomegaly. No palpable masses. Skin: No rash, petechiae or ecchymoses. No evidence of malignancy. Extremities: No cyanosis, clubbing or edema. Labs/Imaging:  Lab Results   Component Value Date/Time    WBC 5.1 08/02/2022 02:31 PM    HGB 11.7 08/02/2022 02:31 PM    HCT 35.0 08/02/2022 02:31 PM     08/02/2022 02:31 PM    MCV 90.9 08/02/2022 02:31 PM       Lab Results   Component Value Date/Time     08/02/2022 02:31 PM    K 3.8 08/02/2022 02:31 PM     08/02/2022 02:31 PM    CO2 26 08/02/2022 02:31 PM    BUN 16 08/02/2022 02:31 PM    GFRAA 47 08/02/2022 02:31 PM    GLOB 3.2 08/02/2022 02:31 PM    ALT 16 08/02/2022 02:31 PM       Above results reviewed with patient. ASSESSMENT:   This gentleman had a rather substantial melanoma resected from his right upper back. The lymphoscintigraphy suggested a sentinel node in the right supraclavicular region. This was visualized on the  PET scan as well. He underwent an excision of this lymph node which was positive for metastatic melanoma. He does have a BRAF mutation but it is not in the typical V600E region and less likely to respond to targeted therapy. Given the above we ultimately agreed to proceed with immunotherapy. He began this in May 2022.     Fatigue-the patient has concerns that nivolumab is contributing to this although he states it all started prior to the beginning of his immunotherapy. This raises the question of whether it has something to do with an underlying medical issue or other medications he is taking. He now attributes fatigue to nausea and feels that if this was controlled he could be more active. TSH completed and normal. Cortisol check at last visit and WNL. PLAN:  Proceed with cycle 4 of nivolumab. Will increase Zofran to 8mg and instructed to take as needed.  Also instructed to consume small, frequent meals as this has helped his nausea in the past.         RESUSCITATION DIRECTIVES/HOSPICE CARE;  Full Support                  BOB Josue  Hematology & Oncology  [address] [address]  Office : [phone]  Fax : [phone]

## 2022-08-02 NOTE — PROGRESS NOTES
Patient arrived ambulatory to infusion area. Opdivo infusion completed. Patient tolerated well. PIV removed. Discharged ambulatory. Patient aware of next infusion 8/30.

## 2022-08-03 ENCOUNTER — OFFICE VISIT (OUTPATIENT)
Dept: SURGERY | Age: 77
End: 2022-08-03
Payer: MEDICARE

## 2022-08-03 DIAGNOSIS — C43.59 MELANOMA OF BACK (HCC): ICD-10-CM

## 2022-08-03 DIAGNOSIS — C43.9 METASTATIC MELANOMA (HCC): Primary | ICD-10-CM

## 2022-08-03 PROCEDURE — 1123F ACP DISCUSS/DSCN MKR DOCD: CPT | Performed by: SURGERY

## 2022-08-03 PROCEDURE — 99214 OFFICE O/P EST MOD 30 MIN: CPT | Performed by: SURGERY

## 2022-08-03 PROCEDURE — 4004F PT TOBACCO SCREEN RCVD TLK: CPT | Performed by: SURGERY

## 2022-08-03 PROCEDURE — G8420 CALC BMI NORM PARAMETERS: HCPCS | Performed by: SURGERY

## 2022-08-03 PROCEDURE — G8427 DOCREV CUR MEDS BY ELIG CLIN: HCPCS | Performed by: SURGERY

## 2022-08-03 NOTE — PROGRESS NOTES
H&P/Consult Note/Progress Note/Office Note:   Toña Sung  MRN: [id number]  :1945  Age:77 y.o.    HPI: Toña Sung is a 69 y.o. male who is s/p WLE  and STSG of a cT4bN0, pT4bNx (later N1) right neck/upper back melanoma on 10/19/21. His pathology report identified no residual melanoma. His pre-op lymphoscintigraphy mapped to a node in the upper right chest/lower right neck deep to clavicle and was not thought to be accessible through a neck incision and was not removed initially     He returned on 22 complaining of a 30lb wt loss since 2021. PET/CT imaging done 22 as below with an enlarged, hypermetabolic right supraclavicular mass confirmed on neck CT on 3/24/22  He is s/p supraclavicular mass excision planned by Dr Sharon Coello of ENT on 22  \"RIGHT NECK MASS\": [address]. Sign Out Date: 2022  Anna Werner MD      He began treatment with adjuvant nivolumab in May 2022 with Dr Tico Ariza           Prior to his [facility], he was referred by Dr. Radha Pina and had a shave biopsy on 2021 shown below. Breslow depth 9.3 mm, Ta's IV +ulceration; 18mitosis/sq mm; no satellitosis, regression, or LVI   Skin lesion has been present for many months      Nothing particular made it better or worse. He has a history of severe pneumonia by his report but records below indicate the pulmonology was quite worried about the possibility of malignancy. It appears he was lost to follow-up with our pulmonologist but he and his wife report he has been seeing a pulmonologist in [address] named Dr. Moni Jamison. 16 PET/CT   Impression:   1. Interval growth of the large centrally cavitary left apical mass now with air-fluid level,   most likely representing an enlarging primary bronchogenic carcinoma.    2.  Increased small nodular opacities in the right upper lobe, some demonstrating mild FDG uptake,   these may be infectious or inflammatory, although metastatic disease cannot be excluded. 3.  Increased mild mediastinal lymphadenopathy, also demonstrating mild FDG uptake, may be hyperplastic or neoplastic. 4.  No evidence of metastatic disease in the abdomen or pelvis. 12/13/16 lung biopsy   \"WENDI MASS BIOPSY\":  FRAGMENTS OF BRONCHIAL MUCOSA WITH ACUTE AND CHRONIC INFLAMMATION,   NONDIAGNOSTIC FOR MALIGNANT TUMOR AND GRANULOMA. FRAGMENTS OF ACUTELY INFLAMED FIBRINOUS DEBRIS SUSPICIOUS FOR ULCER BASE      11/13/17 Chest CT   IMPRESSION:   1. Decreasing size of the left upper lobe cavitary lesion at the apex. 2. Stable 5 mm subpleural pulmonary nodule in the lingula. Recommend continued imaging follow-up in 12 months. 10/12/21 CXR, 2 views (pre-op)   COPD with clear lungs. Scarring at the left lung apex. The cardiac  and mediastinal contours and pulmonary vascularity are normal.   The bones and soft tissues are within normal limits. IMPRESSION   COPD. No acute cardiopulmonary disease. 10/12/21 LFTs (pre-op) normal  8/2/22 LFTs: normal          2/28/22 Whole Body PET/CT   Head and Neck: No calvarial or scalp lesion. No suspicious uptake in the neck soft tissues. Symmetric uptake within the brain. Enlarged right supraclavicular lymph node measuring 1.7 cm short axis, maximum SUV 6.8. No other enlarged or hypermetabolic cervical, neck base, or supraclavicular nodes. Chest: Scarring and volume loss in the left upper lobe. Stable pleural-based nodule anteriorly in the right upper lobe and scarring inferiorly in the right   upper lobe adjacent to the minor fissure. No new or enlarging lung nodule. No suspicious FDG uptake within the chest.       Multivessel coronary atherosclerotic calcifications. Aortic atherosclerotic ossifications without aneurysm. Abdomen/Pelvis: No enlarged or hypermetabolic lymph nodes within the abdomen or pelvis.    No worrisome focal FDG uptake in the abdominal viscera. Multiple small gallstones. No biliary dilation. Aortic atherosclerotic calcifications without aneurysm. The adrenal glands, pancreas, spleen, and kidneys are unremarkable. Bones and soft tissues: No aggressive bone lesion or worrisome focal FDG uptake. Linear uptake of the left upper extremity, likely within the cephalic vein. IMPRESSION   1. Enlarged, hypermetabolic right supraclavicular lymph node concerning for luz metastasis. 2.  Linear uptake within the left upper extremity, likely within the cephalic vein. Undetermined significance but possibly related to venous stasis. 3/24/22 CT neck with IV contrast   Glands: Parotid and submandibular glands are normal. 12 mm nodule right thyroid lobe with smaller nodule suggested. Vascular: Atherosclerotic changes are seen throughout the carotid bifurcations. Lymph Nodes: 2.5 cm soft tissue mass in right supraclavicular fossa correlating with PET/CT findings. Airway Structures/ Tonsils: Nasopharynx, oropharynx, and hypopharynx are normal.   Larynx is normal. No compression of the airway. Parapharyngeal fat is normal.   [place] and lingual tonsils are normal.       Other: There are degenerative changes throughout cervical spine. Imaged   intracranial structures are normal. Orbits are normal. Paranasal sinuses and mastoid air cells are normal.   There is apical scarring, left greater than right with underlying emphysematous changes. IMPRESSION   1. Right supraclavicular mass corresponding to PET/CT abnormality.               Past Medical History:   Diagnosis Date    Adverse effect of anesthesia     Had problems urinating after last general anesthesia     Arthritis     Chronic obstructive pulmonary disease (HCC)     daily and prn inhaler, Followed by Dr. Babatunde Adkins     anxiety, treated with med    Ear problems     Erectile disorder due to medical condition in male patient     GERD (gastroesophageal reflux disease)     managed with meds     Gout     Hearing reduced     has hearing aids, but wears them infrequently    Heart murmur     Echo 4/2021- moderate aortic stenosis, EF 50-55%    History of colon polyps     History of kidney stones     1-2 episodes years ago. [place] (hard of hearing)     Hyperlipidemia     on no med    Hypertension     Managed with meds     Melanoma ([address]     Moderate aortic stenosis by prior echocardiogram 04/2021    Peripheral vascular disease ([facility] [address]     Psoriasis     Transient ischemic attack     patient denies 6/26/20     Past Surgical History:   Procedure Laterality Date    ANTERIOR CRUCIATE LIGAMENT REPAIR      BACK SURGERY      COLONOSCOPY N/A 7/6/2020    COLONOSCOPY/BMI 23 performed by Ulices Parker MD at [facility] ENDOSCOPY    HEENT Right 05/06/2022    excisional bx of R supra-clavicular neck mass- Erman Bench    LITHOTRIPSY      MALIGNANT SKIN LESION EXCISION       Current Outpatient Medications   Medication Sig    ondansetron (ZOFRAN) 4 MG tablet Take 2 tablets by mouth every 8 hours as needed for Nausea or Vomiting    ondansetron (ZOFRAN-ODT) 4 MG disintegrating tablet Take 1 tablet by mouth 3 times daily as needed for Nausea or Vomiting    albuterol sulfate  (90 Base) MCG/ACT inhaler Inhale 2 puffs into the lungs every 4 hours as needed    allopurinol (ZYLOPRIM) 100 MG tablet Take 100 mg by mouth daily    amLODIPine (NORVASC) 10 MG tablet TAKE ONE TABLET BY MOUTH ONCE DAILY. budesonide-formoterol (SYMBICORT) 160-4.5 MCG/ACT AERO Inhale 2 puffs into the lungs 2 times daily    escitalopram (LEXAPRO) 20 MG tablet Take 20 mg by mouth daily    LORazepam (ATIVAN) 1 MG tablet Take 1 tab twice a day as needed for anxiety    magnesium oxide (MAG-OX) 400 MG tablet Take 400 mg by mouth daily    metoprolol succinate (TOPROL XL) 100 MG extended release tablet TAKE ONE TABLET BY MOUTH ONCE DAILY.  Indications: ventricular rate control in atrial fibrillation    pravastatin (PRAVACHOL) 40 MG tablet TAKE 1 TABLET BY MOUTH EVERY NIGHT    tamsulosin (FLOMAX) 0.4 MG capsule Take 0.4 mg by mouth daily     No current facility-administered medications for this visit. ALLERGIES:  Patient has no known allergies. Social History     Socioeconomic History    Marital status:    Tobacco Use    Smoking status: Some Days     Packs/day: 0.25     Types: Cigarettes    Smokeless tobacco: Never    Tobacco comments:     Quit smoking: currently smoking 2-3 cigarettes a day   Substance and Sexual Activity    Alcohol use: No    Drug use: No   Social History Narrative     and lives with wife. No pets. Social Determinants of Health     Financial Resource Strain: Low Risk     Difficulty of Paying Living Expenses: Not hard at all   Food Insecurity: No Food Insecurity    Worried About Running Out of Food in the Last Year: Never true    Ran Out of Food in the Last Year: Never true     Social History     Tobacco Use   Smoking Status Some Days    Packs/day: 0.25    Types: Cigarettes   Smokeless Tobacco Never   Tobacco Comments    Quit smoking: currently smoking 2-3 cigarettes a day     Family History   Problem Relation Age of Onset    Hypertension Mother     Stroke Father     Hypertension Father     Kidney Disease Mother      ROS: The patient has no difficulty with chest pain or shortness of breath. No fever or chills. Comprehensive review of systems was otherwise unremarkable except as noted above. Physical Exam:   There were no vitals taken for this visit. There were no vitals filed for this visit. [facility]@  [unfilled]    Constitutional: Alert, oriented, cooperative patient in no acute distress; appears stated age    Eyes:Sclera are clear. EOMs intact  ENMT: no external lesions gross hearing normal; no obvious neck masses, no ear or lip lesions, nares normal  CV: RRR. Normal perfusion  Resp: No JVD. Breathing is  non-labored; no audible wheezing.          Healed wide excision site right postero-lateral neck/upper back with no evidence of local regional recurrence by exam   No satellite lesions   No regional adenopathy. GI: soft and non-distended     Musculoskeletal: unremarkable with normal function. No embolic signs or cyanosis. Neuro:  Oriented; moves all 4; no focal deficits  Psychiatric: normal affect and mood, no memory impairment    Recent vitals (if inpt):  @IPVITALS(24:)@    Amount and/or Complexity of Data Reviewed and Analyzed:  I reviewed and analyzed all of the unique labs and radiologic studies that are shown below as well as any that are in the HPI, and any that are in the expanded problem list below  *Each unique test, order, or document contributes to the combination of 2 or combination of 3 in Category 1 below. For this visit I also reviewed old records and prior notes.       Recent Labs     08/02/22  1431   WBC 5.1   HGB 11.7*         K 3.8   *   CO2 26   BUN 16   ALT 16     Review of most recent CBC  Lab Results   Component Value Date    WBC 5.1 08/02/2022    HGB 11.7 (L) 08/02/2022    HCT 35.0 08/02/2022    MCV 90.9 08/02/2022     08/02/2022       Review of most recent BMP  Lab Results   Component Value Date/Time     08/02/2022 02:31 PM    K 3.8 08/02/2022 02:31 PM     08/02/2022 02:31 PM    CO2 26 08/02/2022 02:31 PM    BUN 16 08/02/2022 02:31 PM    CREATININE 1.80 08/02/2022 02:31 PM    GLUCOSE 182 08/02/2022 02:31 PM    CALCIUM 8.5 08/02/2022 02:31 PM        Review of most recent LFTs (and lipase if done)  Lab Results   Component Value Date    ALT 16 08/02/2022    AST 13 (L) 08/02/2022    ALKPHOS 123 08/02/2022    BILITOT 0.9 08/02/2022     Lab Results   Component Value Date    LIPASE 22 02/24/2020       No results found for: INR, APTT, LCAD    Review of most recent HgbA1c  Lab Results   Component Value Date    LABA1C 5.2 07/21/2022     Lab Results   Component Value Date     07/21/2022       Nutritional assessment suggested. Vascular: Atherosclerotic changes are seen throughout the carotid bifurcations. Lymph Nodes: 2.5 cm soft tissue mass in right supraclavicular fossa correlating  with PET/CT findings. Airway Structures/ Tonsils: Nasopharynx, oropharynx, and hypopharynx are normal.  Larynx is normal. No compression of the airway. Parapharyngeal fat is normal.  [place] and lingual tonsils are normal.    Other: There are degenerative changes throughout cervical spine. Imaged  intracranial structures are normal. Orbits are normal. Paranasal sinuses and  mastoid air cells are normal. There is apical scarring, left greater than right  with underlying emphysematous changes. Impression  1. Right supraclavicular mass corresponding to PET/CT abnormality. CPT code(s) [id number]    US Result (most recent):  No results found for this or any previous visit from the past 3650 days. Admission date (for inpatients): (Not on file)   * No surgery found *  * No surgery found *        ASSESSMENT/PLAN:  [unfilled]  Active Problems:    * No active hospital problems. *  Resolved Problems:    * No resolved hospital problems. *     Patient Active Problem List    Diagnosis Date Noted    Confusion 06/15/2022     Priority: Medium    Chronic renal disease, stage III [facility]) [955200] 06/15/2022     Priority: Medium    Metastatic melanoma ([facility] [address] 06/15/2022     Priority: Medium    Melanoma of back ([facility] [address] 09/27/2021     10/19/21 s/p WLE right upper back melanoma; Dr Sharri Golden": NO RESIDUAL MALIGNANT MELANOMA   IS IDENTIFIED.      Sign Out Date: 10/22/2021  Anna Werner MD         Aortic heart murmur 03/09/2021    Encounter for long-term current use of medication 02/18/2019    Lung nodule 05/21/2018    Adenomatous polyp of colon 02/15/2018    Hearing problem 02/15/2018    Mild depression ([facility] [address] 02/15/2018    Cigarette nicotine dependence without complication 73/69/5087    Weight loss 02/09/2017 COPD, severe ([facility] [address] 01/10/2017     CPFTs 12/28/16--The patient has an obstructive defect with a response to   bronchodilators. Lung volumes are consistent with air trapping. The   reduction in forced vital capacity is probably due to air trapping but   because the total lung capacity was not recorded we cannot completely rule   out a restrictive defect. The diffusing capacity is decreased which is   probably consistent with emphysema. ASCVD (arteriosclerotic cardiovascular disease) 11/17/2016     dyspnea pulmonary and cardiac ? Precordial pain 11/17/2016    Skin cyst 04/25/2016    Hx of gout     Hx of transient ischemic attack (TIA)     Erectile disorder due to medical condition in male patient     Psoriasis     Peripheral vascular disease ([facility] [address]     Arthritis     Hyperlipidemia     Hypertension           Number and Complexity of Problems addressed and   Risks of complications and/or morbidity of management        cT4bN0, pT4bN1 right neck/right upper back invasive melanoma  He is s/p  WLE of a right neck/upper back melanoma on 10/19/21. No extrathoracic sent nodes at time of WLE    He saw me on 2/21/22 complaining of a 30lb wt loss since Sept 2021. PET/CT showed a supraclavicular mass which is likely a metastasis. It was difficult to palpate on exam and somewhat retroclavicular. He is s/p supraclavicular mass excision planned by Dr William Patel of ENT on 5/6/22  \"RIGHT NECK MASS\": FINDINGS CONSISTENT WITH MALIGNANT MELANOMA. Sign Out Date: 5/9/2022  Yamilet Jose MD      He began treatment with adjuvant nivolumab in May 2022 with Dr Sotelo Setting  High risk  Continue immune therapy  See me in 3 months       History of lung mass/pneumonia  I requested old records from his current pulmonologist in [place] who they identifies Dr. Madilyn Brittle. I see nothing in care everywhere and it looks as if he was lost to follow-up with [facility] pulmonology in about 2017.   He denies any known history of lung malignancy  PET CT imaging was unremarkable for chest malignancy               Level of MDM (2/3 elements below)  Number and Complexity of Problems Addressed Amount and/or Complexity of Data to be Reviewed and Analyzed  *Each unique test, order, or document contributes to the combination of 2 or combination of 3 in Category 1 below. Risk of Complications and/or Morbidity or Mortality of pt Management     05739  60597 SF Minimal  ?1self-limited or minor problem Minimal or none Minimal risk of morbidity from additional diagnostic testing or Rx   30116  64541 Low Low  ? 2or more self-limited or minor problems;    or  ? 1stable chronic illness;    or  ?1acute, uncomplicated illness or injury   Limited  (Must meet the requirements of at least 1 of the 2 categories)  Category 1: Tests and documents   ? Any combination of 2 from the following:  ?Review of prior external note(s) from each unique source*;  ?review of the result(s) of each unique test*;   ?ordering of each unique test*    or   Category 2: Assessment requiring an independent historian(s)  (For the categories of independent interpretation of tests and discussion of management or test interpretation, see moderate or high) Low risk of morbidity from additional diagnostic testing or treatment     33231  57390 Mod Moderate  ? 1or more chronic illnesses with exacerbation, progression, or side effects of treatment;    or  ?2or more stable chronic illnesses;    or  ?1undiagnosed new problem with uncertain prognosis;    or  ?1acute illness with systemic symptoms;    or  ?1acute complicated injury   Moderate  (Must meet the requirements of at least 1 out of 3 categories)  Category 1: Tests, documents, or independent historian(s)  ? Any combination of 3 from the following:   ?Review of prior external note(s) from each unique source*;  ?Review of the result(s) of each unique test*;  ?Ordering of each unique test*;  ?Assessment requiring an independent historian(s)    or  Category 2: Independent interpretation of tests   ? Independent interpretation of a test performed by another physician/other qualified health care professional (not separately reported);     or  Category 3: Discussion of management or test interpretation  ? Discussion of management or test interpretation with external physician/other qualified health care professional/appropriate source (not separately reported)   Moderate risk of morbidity from additional diagnostic testing or treatment  Examples only:  ?Prescription drug management   ? Decision regarding minor surgery with identified patient or procedure risk factors  ? Decision regarding elective major surgery without identified patient or procedure risk factors   ? Diagnosis or treatment significantly limited by social determinants of health       [id number] High High  ? 1or more chronic illnesses with severe exacerbation, progression, or side effects of treatment;    or  ?1 acute or chronic illness or injury that poses a threat to life or bodily function   Extensive  (Must meet the requirements of at least 2 out of 3 categories)  Category 1: Tests, documents, or independent historian(s)  ? Any combination of 3 from the following:   ?Review of prior external note(s) from each unique source*;  ?Review of the result(s) of each unique test*;   ?Ordering of each unique test*;   ?Assessment requiring an independent historian(s)    or   Category 2: Independent interpretation of tests   ? Independent interpretation of a test performed by another physician/other qualified health care professional (not separately reported);     or  Category 3: Discussion of management or test interpretation  ? Discussion of management or test interpretation with external physician/other qualified health care professional/appropriate source (not separately reported)   High risk of morbidity from additional diagnostic testing or treatment  Examples only:  ?Drug therapy requiring intensive monitoring for toxicity  ? Decision regarding elective major surgery with identified patient or procedure risk factors  ? Decision regarding emergency major surgery  ? Decision regarding hospitalization  ? Decision not to resuscitate or to de-escalate care because of poor prognosis             I have personally performed a face-to-face diagnostic evaluation and management  service on this patient. I have independently seen the patient. I have independently obtained the above history from the patient/family. I have independently examined the patient with above findings. I have independently reviewed data/labs for this patient and developed the above plan of care (MDM).       Signed: Priya Barrios MD  8/3/2022    6:00 AM

## 2022-08-19 ENCOUNTER — TELEPHONE (OUTPATIENT)
Dept: INTERNAL MEDICINE CLINIC | Facility: CLINIC | Age: 77
End: 2022-08-19

## 2022-08-24 RX ORDER — BUDESONIDE AND FORMOTEROL FUMARATE DIHYDRATE 160; 4.5 UG/1; UG/1
AEROSOL RESPIRATORY (INHALATION)
Qty: 10.2 G | Refills: 5 | Status: SHIPPED | OUTPATIENT
Start: 2022-08-24

## 2022-08-25 ENCOUNTER — OFFICE VISIT (OUTPATIENT)
Dept: INTERNAL MEDICINE CLINIC | Facility: CLINIC | Age: 77
End: 2022-08-25
Payer: MEDICARE

## 2022-08-25 VITALS
RESPIRATION RATE: 17 BRPM | DIASTOLIC BLOOD PRESSURE: 69 MMHG | BODY MASS INDEX: 21.41 KG/M2 | TEMPERATURE: 97.4 F | OXYGEN SATURATION: 95 % | SYSTOLIC BLOOD PRESSURE: 132 MMHG | HEIGHT: 67 IN | HEART RATE: 66 BPM

## 2022-08-25 DIAGNOSIS — J44.9 COPD (CHRONIC OBSTRUCTIVE PULMONARY DISEASE) WITH CHRONIC BRONCHITIS (HCC): Primary | ICD-10-CM

## 2022-08-25 DIAGNOSIS — M25.421 EFFUSION OF RIGHT ELBOW: ICD-10-CM

## 2022-08-25 DIAGNOSIS — Z09 HOSPITAL DISCHARGE FOLLOW-UP: ICD-10-CM

## 2022-08-25 LAB
ANION GAP SERPL CALC-SCNC: 7 MMOL/L (ref 7–16)
BASOPHILS # BLD: 0 K/UL (ref 0–0.2)
BASOPHILS NFR BLD: 0 % (ref 0–2)
BUN SERPL-MCNC: 21 MG/DL (ref 8–23)
CALCIUM SERPL-MCNC: 8.3 MG/DL (ref 8.3–10.4)
CHLORIDE SERPL-SCNC: 110 MMOL/L (ref 98–107)
CO2 SERPL-SCNC: 24 MMOL/L (ref 21–32)
CREAT SERPL-MCNC: 2 MG/DL (ref 0.8–1.5)
DIFFERENTIAL METHOD BLD: ABNORMAL
EOSINOPHIL # BLD: 0.2 K/UL (ref 0–0.8)
EOSINOPHIL NFR BLD: 2 % (ref 0.5–7.8)
ERYTHROCYTE [DISTWIDTH] IN BLOOD BY AUTOMATED COUNT: 14.8 % (ref 11.9–14.6)
GLUCOSE SERPL-MCNC: 144 MG/DL (ref 65–100)
HCT VFR BLD AUTO: 35.9 % (ref 41.1–50.3)
HGB BLD-MCNC: 11.7 G/DL (ref 13.6–17.2)
IMM GRANULOCYTES # BLD AUTO: 0 K/UL (ref 0–0.5)
IMM GRANULOCYTES NFR BLD AUTO: 0 % (ref 0–5)
LYMPHOCYTES # BLD: 0.8 K/UL (ref 0.5–4.6)
LYMPHOCYTES NFR BLD: 9 % (ref 13–44)
MCH RBC QN AUTO: 30.6 PG (ref 26.1–32.9)
MCHC RBC AUTO-ENTMCNC: 32.6 G/DL (ref 31.4–35)
MCV RBC AUTO: 94 FL (ref 79.6–97.8)
MONOCYTES # BLD: 0.5 K/UL (ref 0.1–1.3)
MONOCYTES NFR BLD: 5 % (ref 4–12)
NEUTS SEG # BLD: 7.4 K/UL (ref 1.7–8.2)
NEUTS SEG NFR BLD: 84 % (ref 43–78)
NRBC # BLD: 0 K/UL (ref 0–0.2)
PLATELET # BLD AUTO: 162 K/UL (ref 150–450)
PMV BLD AUTO: 10.3 FL (ref 9.4–12.3)
POTASSIUM SERPL-SCNC: 3.7 MMOL/L (ref 3.5–5.1)
RBC # BLD AUTO: 3.82 M/UL (ref 4.23–5.6)
SODIUM SERPL-SCNC: 141 MMOL/L (ref 138–145)
WBC # BLD AUTO: 8.8 K/UL (ref 4.3–11.1)

## 2022-08-25 PROCEDURE — 96372 THER/PROPH/DIAG INJ SC/IM: CPT | Performed by: NURSE PRACTITIONER

## 2022-08-25 PROCEDURE — 1111F DSCHRG MED/CURRENT MED MERGE: CPT | Performed by: NURSE PRACTITIONER

## 2022-08-25 PROCEDURE — 99495 TRANSJ CARE MGMT MOD F2F 14D: CPT | Performed by: NURSE PRACTITIONER

## 2022-08-25 RX ORDER — TRIAMCINOLONE ACETONIDE 40 MG/ML
40 INJECTION, SUSPENSION INTRA-ARTICULAR; INTRAMUSCULAR ONCE
Status: COMPLETED | OUTPATIENT
Start: 2022-08-25 | End: 2022-08-25

## 2022-08-25 RX ORDER — ALBUTEROL SULFATE 90 UG/1
2 AEROSOL, METERED RESPIRATORY (INHALATION) EVERY 4 HOURS PRN
Qty: 18 G | Refills: 3 | Status: SHIPPED | OUTPATIENT
Start: 2022-08-25

## 2022-08-25 RX ORDER — METHYLPREDNISOLONE 4 MG/1
TABLET ORAL
Qty: 1 KIT | Refills: 0 | Status: SHIPPED | OUTPATIENT
Start: 2022-08-25 | End: 2022-08-31

## 2022-08-25 RX ADMIN — TRIAMCINOLONE ACETONIDE 40 MG: 40 INJECTION, SUSPENSION INTRA-ARTICULAR; INTRAMUSCULAR at 11:30

## 2022-08-25 ASSESSMENT — ENCOUNTER SYMPTOMS
WHEEZING: 0
COUGH: 1
SHORTNESS OF BREATH: 1
CHEST TIGHTNESS: 1

## 2022-08-25 NOTE — PROGRESS NOTES
8/25/2022 10:36 AM  Location:[facility] [address] [facility]INE  [place]  Patient #:  [id number]  YOB: 1945          YOUR LAST HEMOGLOBIN A1CS:   No results found for: HBA1C, [id number]    YOUR LAST LIPID PROFILE:   Lab Results   Component Value Date/Time    CHOL 119 04/27/2021 08:13 AM    HDL 34 04/27/2021 08:13 AM    VLDL 18 04/27/2021 08:13 AM         Lab Results   Component Value Date/Time    GFRAA 47 08/02/2022 02:31 PM    BUN 16 08/02/2022 02:31 PM     08/02/2022 02:31 PM    K 3.8 08/02/2022 02:31 PM     08/02/2022 02:31 PM    CO2 26 08/02/2022 02:31 PM           History of Present Illness     Chief Complaint   Patient presents with    Follow-Up from Hospital     TCM: D/C: 8/19/22: [address]: Pneumonia. Still short of breath. Elbow Swelling     Bilateral elbow swelling and drainage. Mr. Barrington Martin is a 69 y.o. male  who presents for TCM. Mr. Barrington Martin presents for TCM follow-up. He was discharged on August 19. He was admitted on August 14 with shortness of breath, diagnosed with COPD exacerbation. [facility].  He underwent a nuclear med scan which was negative for PE. He was treated for aspiration pneumonia and discharged on Levaquin and Flagyl. He has completed his antibiotic course. He returned to the emergency department on August 20 with shortness of breath. Labs were stable with a creatinine of 1.5, BUN of 42, mild hypokalemia of 3.4. An x-ray showed improved bilateral infiltrates. He was COVID-negative and troponin negative. His history is significant for ASCVD, PVD, heart murmur, COPD, CKD 3 and metastatic melanoma, receiving current immunotherapy followed by oncology. He has finished his antibiotics and reports occasional cough with clear phlegm. He reports continued episodes of shortness of breath, wheezing and chest tightness.   He is using his Spiriva and Symbicort inhaler daily and Worried About [address] in the Last Year: Never true    [address] [facility] in the Last Year: Never true     Past Surgical History:   Procedure Laterality Date    ANTERIOR CRUCIATE LIGAMENT REPAIR      BACK SURGERY      COLONOSCOPY N/A 7/6/2020    COLONOSCOPY/BMI 23 performed by Elsa Willard MD at [facility] ENDOSCOPY    HEENT Right 05/06/2022    excisional bx of R supra-clavicular neck mass- Angel Kales    LITHOTRIPSY      MALIGNANT SKIN LESION EXCISION       Current Outpatient Medications   Medication Sig Dispense Refill    tiotropium (SPIRIVA) 18 MCG inhalation capsule Inhale 18 mcg into the lungs daily      SYMBICORT 160-4.5 MCG/ACT AERO INHALE 2 PUFFS BY MOUTH TWICE DAILY 10.2 g 5    ondansetron (ZOFRAN) 4 MG tablet Take 2 tablets by mouth every 8 hours as needed for Nausea or Vomiting 30 tablet 1    albuterol sulfate  (90 Base) MCG/ACT inhaler Inhale 2 puffs into the lungs every 4 hours as needed      allopurinol (ZYLOPRIM) 100 MG tablet Take 100 mg by mouth daily      amLODIPine (NORVASC) 10 MG tablet TAKE ONE TABLET BY MOUTH ONCE DAILY. escitalopram (LEXAPRO) 20 MG tablet Take 20 mg by mouth daily      LORazepam (ATIVAN) 1 MG tablet Take 1 tab twice a day as needed for anxiety      magnesium oxide (MAG-OX) 400 MG tablet Take 400 mg by mouth daily      metoprolol succinate (TOPROL XL) 100 MG extended release tablet TAKE ONE TABLET BY MOUTH ONCE DAILY. Indications: ventricular rate control in atrial fibrillation      pravastatin (PRAVACHOL) 40 MG tablet TAKE 1 TABLET BY MOUTH EVERY NIGHT      tamsulosin (FLOMAX) 0.4 MG capsule Take 0.4 mg by mouth daily       No current facility-administered medications for this visit.      Health Maintenance   Topic Date Due    DTaP/Tdap/Td vaccine (1 - Tdap) Never done    Shingles vaccine (1 of 2) Never done    COVID-19 Vaccine (4 - Booster for Moderna series) 01/28/2022    Flu vaccine (1) 09/01/2022    Annual Wellness Visit (AWV)  03/12/2023    Depression Monitoring  07/05/2023    Pneumococcal 65+ years Vaccine  Completed    Hepatitis A vaccine  Aged Out    Hepatitis B vaccine  Aged Out    Hib vaccine  Aged Out    Meningococcal (ACWY) vaccine  Aged Out     Family History   Problem Relation Age of Onset    Hypertension Mother     Stroke Father     Hypertension Father     Kidney Disease Mother              Review of Systems  Review of Systems   Constitutional:  Negative for chills and fever. Respiratory:  Positive for cough (white phlegm, occasional cough), chest tightness and shortness of breath (has episodes of SOB, spiriva helps. ). Negative for wheezing. Musculoskeletal:  Positive for joint swelling (elbow drainingelbow swelling and draining). All other systems reviewed and are negative. /69 (Site: Left Upper Arm, Position: Sitting, Cuff Size: Large Adult)   Pulse 66   Temp 97.4 °F (36.3 °C) (Skin)   Resp 17   Ht 5' 7\" (1.702 m)   SpO2 95%   BMI 21.41 kg/m²       Physical Exam    Physical Exam  Vitals and nursing note reviewed. Constitutional:       General: He is not in acute distress. Appearance: He is not ill-appearing, toxic-appearing or diaphoretic. Neck:      Vascular: No carotid bruit. Cardiovascular:      Rate and Rhythm: Regular rhythm. Heart sounds: No murmur heard. Pulmonary:      Effort: No respiratory distress. Breath sounds: No stridor. Wheezing (scattered) present. No rales. Comments: O2 sat 98% room air, walking remains at 95 to 97%. Musculoskeletal:      Right upper arm: Normal.      Right elbow: No effusion (draining from epicondyle, reports effusion recurrent). Right forearm: Normal.      Right lower leg: No edema. Left lower leg: No edema. Neurological:      Mental Status: He is oriented to person, place, and time.          Assessment & Plan         Current Outpatient Medications   Medication Sig Dispense Refill    tiotropium (SPIRIVA) 18 MCG inhalation capsule Inhale 18 mcg into the lungs daily      SYMBICORT 160-4.5 MCG/ACT AERO INHALE 2 PUFFS BY MOUTH TWICE DAILY 10.2 g 5    ondansetron (ZOFRAN) 4 MG tablet Take 2 tablets by mouth every 8 hours as needed for Nausea or Vomiting 30 tablet 1    albuterol sulfate  (90 Base) MCG/ACT inhaler Inhale 2 puffs into the lungs every 4 hours as needed      allopurinol (ZYLOPRIM) 100 MG tablet Take 100 mg by mouth daily      amLODIPine (NORVASC) 10 MG tablet TAKE ONE TABLET BY MOUTH ONCE DAILY. escitalopram (LEXAPRO) 20 MG tablet Take 20 mg by mouth daily      LORazepam (ATIVAN) 1 MG tablet Take 1 tab twice a day as needed for anxiety      magnesium oxide (MAG-OX) 400 MG tablet Take 400 mg by mouth daily      metoprolol succinate (TOPROL XL) 100 MG extended release tablet TAKE ONE TABLET BY MOUTH ONCE DAILY. Indications: ventricular rate control in atrial fibrillation      pravastatin (PRAVACHOL) 40 MG tablet TAKE 1 TABLET BY MOUTH EVERY NIGHT      tamsulosin (FLOMAX) 0.4 MG capsule Take 0.4 mg by mouth daily       No current facility-administered medications for this visit. No orders of the defined types were placed in this encounter. Medications Discontinued During This Encounter   Medication Reason    ondansetron (ZOFRAN-ODT) 4 MG disintegrating tablet LIST CLEANUP       1. COPD (chronic obstructive pulmonary disease) with chronic bronchitis (HCC)  Today his O2 sat is 98%, walking he remains in the upper 90s without any acute exacerbation or respiratory distress. He does have scattered wheezes on exam today, will give him a steroid burst, instructed him to use his albuterol inhaler every 3-4 hours while awake, continue Spiriva and Symbicort. He does have follow-up with his pulmonologist in October but will call for a sooner appointment for PFTs or other testing. Instructed him to present to the emergency department for any high fevers, acute respiratory distress. Appears well and nontoxic in office today.   - methylPREDNISolone

## 2022-08-26 ENCOUNTER — TELEPHONE (OUTPATIENT)
Dept: INTERNAL MEDICINE CLINIC | Facility: CLINIC | Age: 77
End: 2022-08-26

## 2022-08-26 NOTE — TELEPHONE ENCOUNTER
Camerongeorgia Patton-  Your labs show stable chronic kidney disease. Continue to stay hydrated and avoid any antiinflammatory medications like ibuprofen and we will continue to monitor. Please let us know if you develop  any new or worsening sx.   Belkys

## 2022-08-29 DIAGNOSIS — R68.89 OTHER GENERAL SYMPTOMS AND SIGNS: ICD-10-CM

## 2022-08-29 DIAGNOSIS — C43.61 MALIGNANT MELANOMA OF RIGHT UPPER LIMB, INCLUDING SHOULDER (HCC): Primary | ICD-10-CM

## 2022-08-30 ENCOUNTER — HOSPITAL ENCOUNTER (OUTPATIENT)
Dept: INFUSION THERAPY | Age: 77
End: 2022-08-30

## 2022-08-30 ENCOUNTER — TELEPHONE (OUTPATIENT)
Dept: ONCOLOGY | Age: 77
End: 2022-08-30

## 2022-08-30 NOTE — TELEPHONE ENCOUNTER
Patient is in the hospital and needs appts canceled for today with Dr Amy Rizvi and labs and infusion

## 2022-09-12 DIAGNOSIS — F32.A MILD DEPRESSION: Primary | ICD-10-CM

## 2022-09-12 NOTE — TELEPHONE ENCOUNTER
Msg sent from [facility] :  Needs a refill on his Lorazepam sent to [facility] at [address]    I have reviewed the patients controlled substance prescription history, as maintained in the [address] [place] [address] prescription monitoring program, so that the prescription(s) for a  controlled substance can be given. Ativan last filled on 8/14/22  40 tabs  20 days.     Pt next appt 9/27/22  Script pended to Dr Gisela Mercer

## 2022-09-13 RX ORDER — LORAZEPAM 1 MG/1
1 TABLET ORAL 2 TIMES DAILY PRN
Qty: 40 TABLET | Refills: 0 | Status: SHIPPED | OUTPATIENT
Start: 2022-09-13 | End: 2022-10-13

## 2022-09-16 ENCOUNTER — OFFICE VISIT (OUTPATIENT)
Dept: INTERNAL MEDICINE CLINIC | Facility: CLINIC | Age: 77
End: 2022-09-16
Payer: MEDICARE

## 2022-09-16 VITALS
WEIGHT: 135.4 LBS | HEIGHT: 67 IN | SYSTOLIC BLOOD PRESSURE: 128 MMHG | RESPIRATION RATE: 16 BRPM | OXYGEN SATURATION: 94 % | BODY MASS INDEX: 21.25 KG/M2 | HEART RATE: 81 BPM | DIASTOLIC BLOOD PRESSURE: 67 MMHG | TEMPERATURE: 97.5 F

## 2022-09-16 DIAGNOSIS — I89.0 LYMPHEDEMA OF LEFT UPPER EXTREMITY: ICD-10-CM

## 2022-09-16 DIAGNOSIS — E55.9 VITAMIN D DEFICIENCY: ICD-10-CM

## 2022-09-16 DIAGNOSIS — I10 PRIMARY HYPERTENSION: ICD-10-CM

## 2022-09-16 DIAGNOSIS — C43.9 METASTATIC MELANOMA (HCC): ICD-10-CM

## 2022-09-16 DIAGNOSIS — M1A.00X0 IDIOPATHIC CHRONIC GOUT WITHOUT TOPHUS, UNSPECIFIED SITE: ICD-10-CM

## 2022-09-16 DIAGNOSIS — F32.A MILD DEPRESSION: ICD-10-CM

## 2022-09-16 DIAGNOSIS — N18.31 STAGE 3A CHRONIC KIDNEY DISEASE (HCC): ICD-10-CM

## 2022-09-16 DIAGNOSIS — Z23 NEEDS FLU SHOT: ICD-10-CM

## 2022-09-16 DIAGNOSIS — J44.9 COPD (CHRONIC OBSTRUCTIVE PULMONARY DISEASE) WITH CHRONIC BRONCHITIS (HCC): Primary | ICD-10-CM

## 2022-09-16 LAB
ANION GAP SERPL CALC-SCNC: 4 MMOL/L (ref 4–13)
BUN SERPL-MCNC: 22 MG/DL (ref 8–23)
CALCIUM SERPL-MCNC: 8.6 MG/DL (ref 8.3–10.4)
CHLORIDE SERPL-SCNC: 108 MMOL/L (ref 101–110)
CO2 SERPL-SCNC: 28 MMOL/L (ref 21–32)
CREAT SERPL-MCNC: 2 MG/DL (ref 0.8–1.5)
GLUCOSE SERPL-MCNC: 160 MG/DL (ref 65–100)
POTASSIUM SERPL-SCNC: 3.1 MMOL/L (ref 3.5–5.1)
SODIUM SERPL-SCNC: 140 MMOL/L (ref 138–145)
URATE SERPL-MCNC: 7.9 MG/DL (ref 2.6–6)

## 2022-09-16 PROCEDURE — G8427 DOCREV CUR MEDS BY ELIG CLIN: HCPCS | Performed by: INTERNAL MEDICINE

## 2022-09-16 PROCEDURE — 99214 OFFICE O/P EST MOD 30 MIN: CPT | Performed by: INTERNAL MEDICINE

## 2022-09-16 PROCEDURE — 4004F PT TOBACCO SCREEN RCVD TLK: CPT | Performed by: INTERNAL MEDICINE

## 2022-09-16 PROCEDURE — G8420 CALC BMI NORM PARAMETERS: HCPCS | Performed by: INTERNAL MEDICINE

## 2022-09-16 PROCEDURE — 3023F SPIROM DOC REV: CPT | Performed by: INTERNAL MEDICINE

## 2022-09-16 PROCEDURE — 1123F ACP DISCUSS/DSCN MKR DOCD: CPT | Performed by: INTERNAL MEDICINE

## 2022-09-16 PROCEDURE — 90694 VACC AIIV4 NO PRSRV 0.5ML IM: CPT | Performed by: INTERNAL MEDICINE

## 2022-09-16 PROCEDURE — G0008 ADMIN INFLUENZA VIRUS VAC: HCPCS | Performed by: INTERNAL MEDICINE

## 2022-09-16 NOTE — PROGRESS NOTES
9/16/2022 2:33 PM  Location:[facility] [address] [facility]INE  [place]  Patient #:  [id number]  YOB: 1945          YOUR LAST HEMOGLOBIN A1CS:   No results found for: HBA1C, [id number]    YOUR LAST LIPID PROFILE:   Lab Results   Component Value Date/Time    CHOL 119 04/27/2021 08:13 AM    HDL 34 04/27/2021 08:13 AM    VLDL 18 04/27/2021 08:13 AM         Lab Results   Component Value Date/Time    GFRAA 42 08/25/2022 10:58 AM    BUN 21 08/25/2022 10:58 AM     08/25/2022 10:58 AM    K 3.7 08/25/2022 10:58 AM     08/25/2022 10:58 AM    CO2 24 08/25/2022 10:58 AM           History of Present Illness     Chief Complaint   Patient presents with    6 Month Follow-Up     Pt presents to the office today for a 6 month follow-up  Has been on immunotherapy for 3 mo    COPD    Hand Problem     Left hand is swollen bad; been like that for 3 weeks   This patient is complaining of his left hand and forearm swelling over the past few weeks. There is minimal pain associated with this except for trying to close his left hand. He is presently on immunotherapy for treatment of metastatic melanoma. Mr. Barrington Martin is a 69 y.o. male  who presents for office visit      No Known Allergies  Past Medical History:   Diagnosis Date    Adverse effect of anesthesia     Had problems urinating after last general anesthesia     Arthritis     Chronic obstructive pulmonary disease (HCC)     daily and prn inhaler, Followed by Dr. Muriel Peters Depression     anxiety, treated with med    Ear problems     Erectile disorder due to medical condition in male patient     GERD (gastroesophageal reflux disease)     managed with meds     Gout     Hearing reduced     has hearing aids, but wears them infrequently    Heart murmur     Echo 4/2021- moderate aortic stenosis, EF 50-55%    History of colon polyps     History of kidney stones     1-2 episodes years ago.     Solomon (hard of hearing)     Hyperlipidemia     on no med    Hypertension     Managed with meds     [facility]an[facility]     Moderate aortic stenosis by prior echocardiogram 04/2021    Peripheral vascular disease ([facility]     Psoriasis     Transient ischemic attack     patient denies 6/26/20     Social History     Socioeconomic History    Marital status:      Spouse name: None    Number of children: None    Years of education: None    Highest education level: None   Tobacco Use    Smoking status: Some Days     Packs/day: 0.25     Types: Cigarettes    Smokeless tobacco: Never    Tobacco comments:     Quit smoking: currently smoking 2-3 cigarettes a day   Substance and Sexual Activity    Alcohol use: No    Drug use: No   Social History Narrative     and lives with wife. No pets. Social Determinants of Health     Financial Resource Strain: Low Risk     Difficulty of Paying Living Expenses: Not hard at all   Food Insecurity: No Food Insecurity    Worried About Running Out of Food in the Last Year: Never true    Christina of Food in the Last Year: Never true     Past Surgical History:   Procedure Laterality Date    ANTERIOR CRUCIATE LIGAMENT REPAIR      BACK SURGERY      COLONOSCOPY N/A 7/6/2020    COLONOSCOPY/BMI 23 performed by Elsa Willard MD at [address] [place] [address] Right 05/06/2022    excisional bx of R supra-clavicular neck mass- Angel Kales    LITHOTRIPSY      MALIGNANT SKIN LESION EXCISION       Current Outpatient Medications   Medication Sig Dispense Refill    LORazepam (ATIVAN) 1 MG tablet Take 1 tablet by mouth 2 times daily as needed for Anxiety for up to 30 days.  40 tablet 0    tiotropium (SPIRIVA) 18 MCG inhalation capsule Inhale 18 mcg into the lungs daily      albuterol sulfate HFA (PROVENTIL;VENTOLIN;PROAIR) 108 (90 Base) MCG/ACT inhaler Inhale 2 puffs into the lungs every 4 hours as needed for Wheezing 18 g 3    SYMBICORT 160-4.5 MCG/ACT AERO INHALE 2 PUFFS BY MOUTH TWICE DAILY 10.2 g 5    ondansetron (ZOFRAN) 4 MG tablet Take 2 tablets by mouth every 8 hours as needed for Nausea or Vomiting 30 tablet 1    allopurinol (ZYLOPRIM) 100 MG tablet Take 100 mg by mouth daily      amLODIPine (NORVASC) 10 MG tablet 5 mg TAKE ONE TABLET BY MOUTH ONCE DAILY.  escitalopram (LEXAPRO) 20 MG tablet Take 20 mg by mouth daily      magnesium oxide (MAG-OX) 400 MG tablet Take 400 mg by mouth daily      metoprolol succinate (TOPROL XL) 100 MG extended release tablet TAKE ONE TABLET BY MOUTH ONCE DAILY. Indications: ventricular rate control in atrial fibrillation      pravastatin (PRAVACHOL) 40 MG tablet TAKE 1 TABLET BY MOUTH EVERY NIGHT      tamsulosin (FLOMAX) 0.4 MG capsule Take 0.4 mg by mouth daily      cyanocobalamin 1000 MCG tablet Take 1,000 mcg by mouth daily       No current facility-administered medications for this visit. Health Maintenance   Topic Date Due    DTaP/Tdap/Td vaccine (1 - Tdap) Never done    Shingles vaccine (1 of 2) Never done    COVID-19 Vaccine (4 - Booster for Milo [place] series) 01/28/2022    Annual Wellness Visit (AWV)  03/12/2023    Depression Monitoring  07/05/2023    Flu vaccine  Completed    Pneumococcal 65+ years Vaccine  Completed    Hepatitis A vaccine  Aged Out    Hepatitis B vaccine  Aged Out    Hib vaccine  Aged Out    Meningococcal (ACWY) vaccine  Aged Out     Family History   Problem Relation Age of Onset    Hypertension Mother     Stroke Father     Hypertension Father     Kidney Disease Mother              Review of Systems  Review of Systems    /67 (Site: Right Upper Arm, Position: Sitting, Cuff Size: Medium Adult)   Pulse 81   Temp 97.5 °F (36.4 °C) (Temporal)   Resp 16   Ht 5' 7\" (1.702 m)   Wt 135 lb 6.4 oz (61.4 kg)   SpO2 94% Comment: RA  BMI 21.21 kg/m²       Physical Exam    Physical Exam  Constitutional:       General: He is not in acute distress. Appearance: Normal appearance. He is not ill-appearing.    HENT:      Head: Normocephalic and atraumatic. Eyes:      Extraocular Movements: Extraocular movements intact. Pupils: Pupils are equal, round, and reactive to light. Cardiovascular:      Rate and Rhythm: Normal rate and regular rhythm. Pulmonary:      Effort: Pulmonary effort is normal.      Breath sounds: Normal breath sounds. Musculoskeletal:        Arms:       Comments: Marked pitting edema of left forearm and hand, no drainage    Skin:     General: Skin is warm. Neurological:      Mental Status: He is alert. Motor: No weakness. Psychiatric:         Mood and Affect: Mood normal.         Behavior: Behavior normal.         Thought Content: Thought content normal.         Judgment: Judgment normal.       Assessment & Plan    Encounter Diagnoses   Name Primary?  COPD (chronic obstructive pulmonary disease) with chronic bronchitis (HCC) Yes    Needs flu shot     Mild depression ([facility] [facility]     Vitamin D deficiency     Stage 3a chronic kidney disease ([facility]     Metastatic melanoma ([facility]     Lymphedema of left upper extremity     Primary hypertension     Idiopathic chronic gout without tophus, unspecified site        Current Outpatient Medications   Medication Sig Dispense Refill    LORazepam (ATIVAN) 1 MG tablet Take 1 tablet by mouth 2 times daily as needed for Anxiety for up to 30 days. 40 tablet 0    tiotropium (SPIRIVA) 18 MCG inhalation capsule Inhale 18 mcg into the lungs daily      albuterol sulfate HFA (PROVENTIL;VENTOLIN;PROAIR) 108 (90 Base) MCG/ACT inhaler Inhale 2 puffs into the lungs every 4 hours as needed for Wheezing 18 g 3    SYMBICORT 160-4.5 MCG/ACT AERO INHALE 2 PUFFS BY MOUTH TWICE DAILY 10.2 g 5    ondansetron (ZOFRAN) 4 MG tablet Take 2 tablets by mouth every 8 hours as needed for Nausea or Vomiting 30 tablet 1    allopurinol (ZYLOPRIM) 100 MG tablet Take 100 mg by mouth daily      amLODIPine (NORVASC) 10 MG tablet 5 mg TAKE ONE TABLET BY MOUTH ONCE DAILY.       escitalopram (LEXAPRO) 20 MG tablet Take 20 mg by mouth daily      magnesium oxide (MAG-OX) 400 MG tablet Take 400 mg by mouth daily      metoprolol succinate (TOPROL XL) 100 MG extended release tablet TAKE ONE TABLET BY MOUTH ONCE DAILY. Indications: ventricular rate control in atrial fibrillation      pravastatin (PRAVACHOL) 40 MG tablet TAKE 1 TABLET BY MOUTH EVERY NIGHT      tamsulosin (FLOMAX) 0.4 MG capsule Take 0.4 mg by mouth daily      cyanocobalamin 1000 MCG tablet Take 1,000 mcg by mouth daily       No current facility-administered medications for this visit. Orders Placed This Encounter   Procedures    Influenza, FLUAD, (age 72 y+), IM, Preservative Free, 0.5 mL    Basic Metabolic Panel     Standing Status:   Future     Standing Expiration Date:   9/16/2023    Uric Acid     Standing Status:   Future     Standing Expiration Date:   9/16/2023       There are no discontinued medications. 1. COPD (chronic obstructive pulmonary disease) with chronic bronchitis (HCC)  Stable symptoms noted at this time    2. Needs flu shot     - Influenza, FLUAD, (age 72 y+), IM, Preservative Free, 0.5 mL    3. Mild depression ([facility] [address]  Controlled    4. Vitamin D deficiency       5. Stage 3a chronic kidney disease (HCC)  Stable on previous labs    6. Metastatic melanoma ([address]  As noted followed by oncology on immunotherapy    7. Lymphedema of left upper extremity  This is new onset against the concern would be for lymphatic obstruction with his history of melanoma, metastatic. He has a follow-up with oncology soon    8. Primary hypertension  Controlled  - Basic Metabolic Panel; Future    9. Idiopathic chronic gout without tophus, unspecified site  He is on low-dose allopurinol this will be reevaluated  - Uric Acid; Future      No follow-up provider specified.         Rafael Perkins MD

## 2022-09-18 DIAGNOSIS — Z87.39 HX OF GOUT: ICD-10-CM

## 2022-09-18 DIAGNOSIS — E87.6 HYPOKALEMIA: ICD-10-CM

## 2022-09-18 DIAGNOSIS — Z87.39 HX OF GOUT: Primary | ICD-10-CM

## 2022-09-18 RX ORDER — POTASSIUM CHLORIDE 750 MG/1
10 TABLET, FILM COATED, EXTENDED RELEASE ORAL 2 TIMES DAILY
Qty: 60 TABLET | Refills: 3 | Status: SHIPPED | OUTPATIENT
Start: 2022-09-18

## 2022-09-18 RX ORDER — COLCHICINE 0.6 MG/1
0.6 TABLET ORAL DAILY
Qty: 30 TABLET | Refills: 0 | Status: SHIPPED | OUTPATIENT
Start: 2022-09-18

## 2022-09-18 RX ORDER — ALLOPURINOL 100 MG/1
200 TABLET ORAL DAILY
Qty: 60 TABLET | Refills: 5 | Status: SHIPPED | OUTPATIENT
Start: 2022-09-18

## 2022-09-19 ENCOUNTER — TELEPHONE (OUTPATIENT)
Dept: INTERNAL MEDICINE CLINIC | Facility: CLINIC | Age: 77
End: 2022-09-19

## 2022-09-19 DIAGNOSIS — E87.6 HYPOKALEMIA: Primary | ICD-10-CM

## 2022-09-19 DIAGNOSIS — E87.6 HYPOKALEMIA: ICD-10-CM

## 2022-09-19 RX ORDER — COLCHICINE 0.6 MG/1
0.6 TABLET ORAL DAILY
Qty: 90 TABLET | OUTPATIENT
Start: 2022-09-19

## 2022-09-19 NOTE — TELEPHONE ENCOUNTER
----- Message from Liberty Sellers MD sent at 9/18/2022 11:56 AM EDT -----  Please notify patient that their lab results show a low potassium. I am sending in a prescription for that. Please add magnesium to the labs already ordered. I have ordered. His uric acid or gout test is high which could be related to some swelling in his left hand. Increase allopurinol to 200 mg daily. Add colchicine 0.6 mg 1 daily for 30 days.   These prescriptions were sent to the pharmacy [facility]

## 2022-09-19 NOTE — TELEPHONE ENCOUNTER
----- Message from Titi Tuttle MD sent at 9/19/2022 10:22 AM EDT -----   Please notify patient that their lab results show a low potassium. I am sending in a prescription for that. Please add magnesium to the labs already ordered. I have ordered. His uric acid or gout test is high which could be related to some swelling in his left hand. Increase allopurinol to 200 mg daily. Add colchicine 0.6 mg 1 daily for 30 days.   These prescriptions were sent to the pharmacy [facility]

## 2022-09-20 NOTE — TELEPHONE ENCOUNTER
This has been fully explained to the patient's wife, who indicates understanding.   [employer] notified to add on the magnesium

## 2022-09-21 LAB — MAGNESIUM SERPL-MCNC: 2 MG/DL (ref 1.8–2.4)

## 2022-09-27 ENCOUNTER — TELEPHONE (OUTPATIENT)
Dept: ONCOLOGY | Age: 77
End: 2022-09-27

## 2022-09-30 ENCOUNTER — TELEPHONE (OUTPATIENT)
Dept: INTERNAL MEDICINE CLINIC | Facility: CLINIC | Age: 77
End: 2022-09-30

## 2022-09-30 NOTE — TELEPHONE ENCOUNTER
Called and spoke with pt's wife, the pt has went to Rehab in [facility].  He wasn't discharged home

## 2022-09-30 NOTE — TELEPHONE ENCOUNTER
D/C DATE: 9/30/22    D/C FROM: [facility]    D/C TO: home    PHONE NUMBER TO REACH PATIENT: [phone]    F/U APPT DATE & TIME: [email or web address] with [employer]

## 2022-10-18 ENCOUNTER — TELEPHONE (OUTPATIENT)
Dept: INTERNAL MEDICINE CLINIC | Facility: CLINIC | Age: 77
End: 2022-10-18

## 2022-10-18 NOTE — TELEPHONE ENCOUNTER
D/C DATE: 10/17/22      HOSPITAL PT D/C'd FROM: [facility]    [address] [facility] PT D/C'd FROM: [facility]    D/C TO: 10//17/2022      PHONE NUMBER TO REACH PATIENT: [phone]      F/U APPT DATE & TIME: 11/03/2022@ 2:30

## 2022-10-18 NOTE — TELEPHONE ENCOUNTER
The patient was contacted today to confirm a follow up appointment for Transition Care Management. The patient has good community support and has transitioned into their community setting. The patient will keep their scheduled appointment.  anc

## 2022-10-19 ENCOUNTER — TELEPHONE (OUTPATIENT)
Dept: INTERNAL MEDICINE CLINIC | Facility: CLINIC | Age: 77
End: 2022-10-19

## 2022-10-19 NOTE — TELEPHONE ENCOUNTER
[address] [facility]      Name of the Nurse Calling and Facility: Rojelio Verdugo from [address], [address] [facility] [address] [facility] Number to Reach the Nurse: [phone]          Reason For Call: verbal orders she needs some one to call her for verbal nursing and PT   also clarification on medications   he also needs RX for nebulizer

## 2022-10-19 NOTE — TELEPHONE ENCOUNTER
Per CMS it's ok to give a verbal order for the recommended services. Notified home health nurse, she wanted to clarify the amlodipine dose. Clarified that pt is doing 5mg daily per our records. Rx for nebulizer sent to [facility]. Pt was sent home with medication but no nebulizer.  anc

## 2025-03-04 NOTE — ANESTHESIA PREPROCEDURE EVALUATION
Relevant Problems   No relevant active problems       Anesthetic History               Review of Systems / Medical History  Patient summary reviewed and pertinent labs reviewed    Pulmonary    COPD: mild      Smoker         Neuro/Psych             Comments: Patient denies CVA/TIA Cardiovascular    Hypertension: well controlled          Hyperlipidemia    Exercise tolerance: >4 METS     GI/Hepatic/Renal     GERD: well controlled    Renal disease: CRI       Endo/Other        Arthritis     Other Findings   Comments: Psoriasis           Physical Exam    Airway  Mallampati: II  TM Distance: 4 - 6 cm  Neck ROM: normal range of motion   Mouth opening: Normal     Cardiovascular    Rhythm: regular           Dental    Dentition: Upper partial plate     Pulmonary                 Abdominal         Other Findings            Anesthetic Plan    ASA: 3  Anesthesia type: total IV anesthesia          Induction: Intravenous  Anesthetic plan and risks discussed with: Patient
no

## (undated) DEVICE — CONTAINER PREFIL FRMLN 40ML --

## (undated) DEVICE — GARMENT,MEDLINE,DVT,INT,CALF,LG, GEN2: Brand: MEDLINE

## (undated) DEVICE — SOLUTION IRRIG 1000ML 09% SOD CHL USP PIC PLAS CONTAINER

## (undated) DEVICE — SYR 10ML LUER LOK 1/5ML GRAD --

## (undated) DEVICE — DRSG FOAM MEPILEX TRNSF 6X8IN --

## (undated) DEVICE — 3M™ TEGADERM™ TRANSPARENT FILM DRESSING FRAME STYLE, 1626W, 4 IN X 4-3/4 IN (10 CM X 12 CM), 50/CT 4CT/CASE: Brand: 3M™ TEGADERM™

## (undated) DEVICE — NDL FLTR TIP 5 MIC 18GX1.5IN --

## (undated) DEVICE — DRAPE TWL SURG 16X26IN BLU ORB04] ALLCARE INC]

## (undated) DEVICE — SUTURE VCRL SZ 3-0 L18IN ABSRB UD L26MM SH 1/2 CIR J864D

## (undated) DEVICE — DRAPE,INSTRUMENT,MAGNETIC,10X16: Brand: MEDLINE

## (undated) DEVICE — MARKER UTIL W/RULER AND LBL -- STRL

## (undated) DEVICE — SNARE POLYP SM W13MMXL240CM SHTH DIA2.4MM OVL FLX DISP

## (undated) DEVICE — INSULATED BLADE ELECTRODE: Brand: EDGE

## (undated) DEVICE — T-DRAPE,EXTREMITY,STERILE: Brand: MEDLINE

## (undated) DEVICE — NEEDLE HYPO 25GA L1.5IN BLU POLYPR HUB S STL REG BVL STR

## (undated) DEVICE — 3-0 COATED VICRYL PLUS UNDYED 1X27" SH --

## (undated) DEVICE — BIPOLAR FORCEPS CORD: Brand: VALLEYLAB

## (undated) DEVICE — REM POLYHESIVE ADULT PATIENT RETURN ELECTRODE: Brand: VALLEYLAB

## (undated) DEVICE — 1000 S-DRAPE TOWEL DRAPE 10/BX: Brand: STERI-DRAPE™

## (undated) DEVICE — SUTURE ETHLN SZ 5-0 L18IN NONABSORBABLE BLK L13MM P-3 3/8 698H

## (undated) DEVICE — SHEET, DRAPE, SPLIT, STERILE: Brand: MEDLINE

## (undated) DEVICE — CONNECTOR TBNG OD5-7MM O2 END DISP

## (undated) DEVICE — FORCEPS BX L240CM JAW DIA2.8MM L CAP W/ NDL MIC MESH TOOTH

## (undated) DEVICE — KIT PROCEDURE SURG HEAD AND NECK TOTE

## (undated) DEVICE — CONTAINER,SPECIMEN,O.R.STRL,4.5OZ: Brand: MEDLINE

## (undated) DEVICE — KENDALL RADIOLUCENT FOAM MONITORING ELECTRODE RECTANGULAR SHAPE: Brand: KENDALL

## (undated) DEVICE — HOOK RETRCT DIA5MM SHRP E STAY DISP FOR LONE STAR SELF RET

## (undated) DEVICE — GOWN,REINF,POLY,ECL,PP SLV,3XL,XLONG: Brand: MEDLINE

## (undated) DEVICE — DRAPE,TOP,102X53,STERILE: Brand: MEDLINE

## (undated) DEVICE — TRAY PREP DRY W/ PREM GLV 2 APPL 6 SPNG 2 UNDPD 1 OVERWRAP

## (undated) DEVICE — SOLUTION IV 1000ML 0.9% SOD CHL

## (undated) DEVICE — COVER US PRB W12XL244CM FLD IORT STR TIP

## (undated) DEVICE — CANNULA NSL ORAL AD FOR CAPNOFLEX CO2 O2 AIRLFE

## (undated) DEVICE — Device

## (undated) DEVICE — BANDAGE,GAUZE,BULKEE II,4.5"X4.1YD,STRL: Brand: MEDLINE

## (undated) DEVICE — SUTURE PERMAHAND SZ 3-0 L18IN NONABSORBABLE BLK SILK BRAID A184H

## (undated) DEVICE — SPONGE DISSECT PNUT SM 3/8IN -- 5/PK

## (undated) DEVICE — SUTURE PERMAHAND SZ 2-0 L12X18IN NONABSORBABLE BLK SILK A185H

## (undated) DEVICE — BUTTON SWITCH PENCIL BLADE ELECTRODE, HOLSTER: Brand: EDGE

## (undated) DEVICE — MINOR SPLIT GENERAL: Brand: MEDLINE INDUSTRIES, INC.

## (undated) DEVICE — PAD,NON-ADHERENT,3X8,STERILE,LF,1/PK: Brand: MEDLINE